# Patient Record
Sex: FEMALE | Race: WHITE | NOT HISPANIC OR LATINO | Employment: FULL TIME | RURAL
[De-identification: names, ages, dates, MRNs, and addresses within clinical notes are randomized per-mention and may not be internally consistent; named-entity substitution may affect disease eponyms.]

---

## 2016-05-10 LAB — CRC RECOMMENDATION EXT: NORMAL

## 2017-09-20 ENCOUNTER — HISTORICAL (OUTPATIENT)
Dept: ADMINISTRATIVE | Facility: HOSPITAL | Age: 52
End: 2017-09-20

## 2017-09-20 LAB — HM PAP SMEAR: NORMAL

## 2017-09-22 LAB
LAB AP GENERAL CAT - HISTORICAL: NORMAL
LAB AP INTERPRETATION/RESULT - HISTORICAL: NEGATIVE
LAB AP SPECIMEN ADEQUACY - HISTORICAL: NORMAL
LAB AP SPECIMEN SUBMITTED - HISTORICAL: NORMAL

## 2020-06-02 ENCOUNTER — HISTORICAL (OUTPATIENT)
Dept: ADMINISTRATIVE | Facility: HOSPITAL | Age: 55
End: 2020-06-02

## 2020-06-02 LAB
25(OH)D3 SERPL-MCNC: 34.3 NG/ML
ALBUMIN SERPL BCP-MCNC: 3.8 G/DL (ref 3.5–5)
ALBUMIN/GLOB SERPL: 1.1 {RATIO}
ALP SERPL-CCNC: 88 U/L (ref 46–118)
ALT SERPL W P-5'-P-CCNC: 18 U/L (ref 13–56)
AST SERPL W P-5'-P-CCNC: 18 U/L (ref 15–37)
BASOPHILS # BLD AUTO: 0.07 X10E3/UL (ref 0–0.2)
BASOPHILS NFR BLD AUTO: 1.2 % (ref 0–1)
BILIRUB SERPL-MCNC: 0.3 MG/DL (ref 0–1.2)
BUN SERPL-MCNC: 11 MG/DL (ref 7–18)
BUN/CREAT SERPL: 13
CALCIUM SERPL-MCNC: 8.8 MG/DL (ref 8.5–10.1)
CHLORIDE SERPL-SCNC: 106 MMOL/L (ref 98–107)
CHOLEST SERPL-MCNC: 208 MG/DL
CO2 SERPL-SCNC: 30 MMOL/L (ref 21–32)
CREAT SERPL-MCNC: 0.84 MG/DL (ref 0.5–1.02)
EOSINOPHIL # BLD AUTO: 0.25 X10E3/UL (ref 0–0.5)
EOSINOPHIL NFR BLD AUTO: 4.3 % (ref 1–4)
ERYTHROCYTE [DISTWIDTH] IN BLOOD BY AUTOMATED COUNT: 13.6 % (ref 11.5–14.5)
GLOBULIN SER-MCNC: 3.6 G/DL (ref 2–4)
GLUCOSE SERPL-MCNC: 85 MG/DL (ref 74–106)
HCT VFR BLD AUTO: 43.1 % (ref 38–47)
HDLC SERPL-MCNC: 71 MG/DL
HGB BLD-MCNC: 13.9 G/DL (ref 12–16)
IMM GRANULOCYTES # BLD AUTO: 0.02 X10E3/UL (ref 0–0.04)
IMM GRANULOCYTES NFR BLD: 0.3 % (ref 0–0.4)
LDLC SERPL CALC-MCNC: 118 MG/DL
LYMPHOCYTES # BLD AUTO: 1.34 X10E3/UL (ref 1–4.8)
LYMPHOCYTES NFR BLD AUTO: 22.9 % (ref 27–41)
MCH RBC QN AUTO: 29.6 PG (ref 27–31)
MCHC RBC AUTO-ENTMCNC: 32.3 G/DL (ref 32–36)
MCV RBC AUTO: 91.7 FL (ref 80–96)
MONOCYTES # BLD AUTO: 0.46 X10E3/UL (ref 0–0.8)
MONOCYTES NFR BLD AUTO: 7.9 % (ref 2–6)
MPC BLD CALC-MCNC: 11.3 FL (ref 9.4–12.4)
NEUTROPHILS # BLD AUTO: 3.71 X10E3/UL (ref 1.8–7.7)
NEUTROPHILS NFR BLD AUTO: 63.4 % (ref 53–65)
NONHDLC SERPL-MCNC: 137 MG/DL
NRBC # BLD AUTO: 0 X10E3/UL (ref 0–0)
NRBC, AUTO (.00): 0 /100 (ref 0–0)
PLATELET # BLD AUTO: 318 X10E3/UL (ref 150–400)
POTASSIUM SERPL-SCNC: 4.4 MMOL/L (ref 3.5–5.1)
PROT SERPL-MCNC: 7.4 G/DL (ref 6.4–8.2)
RBC # BLD AUTO: 4.7 X10E6/UL (ref 4.2–5.4)
SODIUM SERPL-SCNC: 140 MMOL/L (ref 136–145)
TRIGL SERPL-MCNC: 96 MG/DL
TSH SERPL DL<=0.005 MIU/L-ACNC: 0.47 UIU/ML (ref 0.36–3.74)
VLDLC SERPL-MCNC: 19 MG/DL
WBC # BLD AUTO: 5.85 X10E3/UL (ref 4.5–11)

## 2020-06-26 ENCOUNTER — HISTORICAL (OUTPATIENT)
Dept: ADMINISTRATIVE | Facility: HOSPITAL | Age: 55
End: 2020-06-26

## 2021-03-09 VITALS
TEMPERATURE: 98 F | DIASTOLIC BLOOD PRESSURE: 70 MMHG | WEIGHT: 174.63 LBS | HEIGHT: 68 IN | BODY MASS INDEX: 26.47 KG/M2 | SYSTOLIC BLOOD PRESSURE: 116 MMHG | RESPIRATION RATE: 20 BRPM | OXYGEN SATURATION: 99 % | HEART RATE: 80 BPM

## 2021-03-09 RX ORDER — ESTRADIOL 0.1 MG/D
1 FILM, EXTENDED RELEASE TRANSDERMAL
COMMUNITY
End: 2023-07-17 | Stop reason: SDUPTHER

## 2021-03-09 RX ORDER — FERROUS SULFATE, DRIED 160(50) MG
1 TABLET, EXTENDED RELEASE ORAL 2 TIMES DAILY
COMMUNITY
End: 2021-03-23

## 2021-03-09 RX ORDER — LEVOTHYROXINE SODIUM 100 UG/1
100 TABLET ORAL
COMMUNITY
End: 2021-03-23 | Stop reason: SDUPTHER

## 2021-03-23 ENCOUNTER — OFFICE VISIT (OUTPATIENT)
Dept: FAMILY MEDICINE | Facility: CLINIC | Age: 56
End: 2021-03-23
Payer: COMMERCIAL

## 2021-03-23 VITALS
HEIGHT: 68 IN | BODY MASS INDEX: 25.64 KG/M2 | TEMPERATURE: 97 F | HEART RATE: 85 BPM | WEIGHT: 169.19 LBS | RESPIRATION RATE: 20 BRPM | SYSTOLIC BLOOD PRESSURE: 116 MMHG | DIASTOLIC BLOOD PRESSURE: 72 MMHG | OXYGEN SATURATION: 98 %

## 2021-03-23 DIAGNOSIS — E03.9 HYPOTHYROIDISM (ACQUIRED): ICD-10-CM

## 2021-03-23 DIAGNOSIS — Z00.00 ROUTINE GENERAL MEDICAL EXAMINATION AT A HEALTH CARE FACILITY: Primary | ICD-10-CM

## 2021-03-23 DIAGNOSIS — Z13.220 SCREENING FOR HYPERLIPIDEMIA: ICD-10-CM

## 2021-03-23 DIAGNOSIS — Z13.1 DIABETES MELLITUS SCREENING: ICD-10-CM

## 2021-03-23 PROCEDURE — 99396 PREV VISIT EST AGE 40-64: CPT | Mod: ,,, | Performed by: NURSE PRACTITIONER

## 2021-03-23 PROCEDURE — 99396 PR PREVENTIVE VISIT,EST,40-64: ICD-10-PCS | Mod: ,,, | Performed by: NURSE PRACTITIONER

## 2021-03-23 RX ORDER — LEVOTHYROXINE SODIUM 100 UG/1
100 TABLET ORAL
Qty: 90 TABLET | Refills: 3 | Status: SHIPPED | OUTPATIENT
Start: 2021-03-23 | End: 2022-05-30

## 2021-03-23 RX ORDER — NALTREXONE HYDROCHLORIDE AND BUPROPION HYDROCHLORIDE 8; 90 MG/1; MG/1
2 TABLET, EXTENDED RELEASE ORAL 2 TIMES DAILY
COMMUNITY
Start: 2021-02-19 | End: 2022-10-10

## 2021-06-16 ENCOUNTER — OFFICE VISIT (OUTPATIENT)
Dept: DERMATOLOGY | Facility: CLINIC | Age: 56
End: 2021-06-16
Payer: COMMERCIAL

## 2021-06-16 VITALS — WEIGHT: 169 LBS | HEIGHT: 68 IN | RESPIRATION RATE: 18 BRPM | BODY MASS INDEX: 25.61 KG/M2

## 2021-06-16 DIAGNOSIS — D22.9 BENIGN NEVUS OF SKIN: ICD-10-CM

## 2021-06-16 DIAGNOSIS — L82.1 SEBORRHEIC KERATOSES: ICD-10-CM

## 2021-06-16 DIAGNOSIS — L82.0 SEBORRHEIC KERATOSES, INFLAMED: ICD-10-CM

## 2021-06-16 DIAGNOSIS — L57.8 OTHER SKIN CHANGES DUE TO CHRONIC EXPOSURE TO NONIONIZING RADIATION: Primary | ICD-10-CM

## 2021-06-16 PROCEDURE — 17110 DESTRUCTION B9 LES UP TO 14: CPT | Mod: ,,, | Performed by: DERMATOLOGY

## 2021-06-16 PROCEDURE — 17110 PR DESTRUCTION BENIGN LESIONS UP TO 14: ICD-10-PCS | Mod: ,,, | Performed by: DERMATOLOGY

## 2021-06-16 PROCEDURE — 3008F BODY MASS INDEX DOCD: CPT | Mod: ,,, | Performed by: DERMATOLOGY

## 2021-06-16 PROCEDURE — 99213 OFFICE O/P EST LOW 20 MIN: CPT | Mod: 25,,, | Performed by: DERMATOLOGY

## 2021-06-16 PROCEDURE — 3008F PR BODY MASS INDEX (BMI) DOCUMENTED: ICD-10-PCS | Mod: ,,, | Performed by: DERMATOLOGY

## 2021-06-16 PROCEDURE — 99213 PR OFFICE/OUTPT VISIT, EST, LEVL III, 20-29 MIN: ICD-10-PCS | Mod: 25,,, | Performed by: DERMATOLOGY

## 2022-05-12 ENCOUNTER — HOSPITAL ENCOUNTER (OUTPATIENT)
Dept: RADIOLOGY | Facility: HOSPITAL | Age: 57
Discharge: HOME | End: 2022-05-12
Payer: COMMERCIAL

## 2022-05-12 ENCOUNTER — HOSPITAL ENCOUNTER (OUTPATIENT)
Dept: RADIOLOGY | Facility: HOSPITAL | Age: 57
Discharge: HOME | End: 2022-05-12
Attending: RADIOLOGY
Payer: COMMERCIAL

## 2022-05-12 VITALS — BODY MASS INDEX: 25.61 KG/M2 | WEIGHT: 169 LBS | HEIGHT: 68 IN

## 2022-05-12 DIAGNOSIS — R92.8 ABNORMAL MAMMOGRAM: ICD-10-CM

## 2022-05-12 DIAGNOSIS — Z12.31 VISIT FOR SCREENING MAMMOGRAM: ICD-10-CM

## 2022-05-12 PROCEDURE — 77067 SCR MAMMO BI INCL CAD: CPT | Mod: TC

## 2022-05-12 PROCEDURE — 76641 ULTRASOUND BREAST COMPLETE: CPT | Mod: 26,LT,, | Performed by: RADIOLOGY

## 2022-05-12 PROCEDURE — 76641 PR US BREAST COMPLETE UNILAT: ICD-10-PCS | Mod: 26,RT,, | Performed by: RADIOLOGY

## 2022-05-12 PROCEDURE — 76641 ULTRASOUND BREAST COMPLETE: CPT | Mod: 26,RT,, | Performed by: RADIOLOGY

## 2022-05-12 PROCEDURE — 77067 MAMMO DIGITAL SCREENING BILAT: ICD-10-PCS | Mod: 26,,, | Performed by: RADIOLOGY

## 2022-05-12 PROCEDURE — 76641 ULTRASOUND BREAST COMPLETE: CPT | Mod: TC,50

## 2022-05-12 PROCEDURE — 77067 SCR MAMMO BI INCL CAD: CPT | Mod: 26,,, | Performed by: RADIOLOGY

## 2022-10-10 ENCOUNTER — OFFICE VISIT (OUTPATIENT)
Dept: FAMILY MEDICINE | Facility: CLINIC | Age: 57
End: 2022-10-10
Payer: COMMERCIAL

## 2022-10-10 ENCOUNTER — PATIENT MESSAGE (OUTPATIENT)
Dept: FAMILY MEDICINE | Facility: CLINIC | Age: 57
End: 2022-10-10
Payer: COMMERCIAL

## 2022-10-10 VITALS
HEART RATE: 87 BPM | DIASTOLIC BLOOD PRESSURE: 78 MMHG | SYSTOLIC BLOOD PRESSURE: 120 MMHG | OXYGEN SATURATION: 99 % | RESPIRATION RATE: 16 BRPM | BODY MASS INDEX: 27.16 KG/M2 | TEMPERATURE: 99 F | WEIGHT: 179.19 LBS | HEIGHT: 68 IN

## 2022-10-10 DIAGNOSIS — Z12.11 SCREENING FOR MALIGNANT NEOPLASM OF COLON: ICD-10-CM

## 2022-10-10 DIAGNOSIS — Z00.00 ROUTINE GENERAL MEDICAL EXAMINATION AT A HEALTH CARE FACILITY: Primary | ICD-10-CM

## 2022-10-10 DIAGNOSIS — Z86.010 PERSONAL HISTORY OF COLONIC POLYPS: ICD-10-CM

## 2022-10-10 DIAGNOSIS — Z11.59 NEED FOR HEPATITIS C SCREENING TEST: ICD-10-CM

## 2022-10-10 DIAGNOSIS — E55.9 VITAMIN D DEFICIENCY: ICD-10-CM

## 2022-10-10 DIAGNOSIS — E03.9 HYPOTHYROIDISM (ACQUIRED): Chronic | ICD-10-CM

## 2022-10-10 DIAGNOSIS — Z13.220 SCREENING FOR LIPOID DISORDERS: ICD-10-CM

## 2022-10-10 DIAGNOSIS — Z13.1 SCREENING FOR DIABETES MELLITUS: ICD-10-CM

## 2022-10-10 PROBLEM — Z86.0100 PERSONAL HISTORY OF COLONIC POLYPS: Status: ACTIVE | Noted: 2022-10-10

## 2022-10-10 LAB
25(OH)D3 SERPL-MCNC: 32.9 NG/ML
CHOLEST SERPL-MCNC: 214 MG/DL (ref 0–200)
CHOLEST/HDLC SERPL: 2.7 {RATIO}
GLUCOSE SERPL-MCNC: 92 MG/DL (ref 74–106)
HCV AB SER QL: NORMAL
HDLC SERPL-MCNC: 79 MG/DL (ref 40–60)
LDLC SERPL CALC-MCNC: 120 MG/DL
LDLC/HDLC SERPL: 1.5 {RATIO}
NONHDLC SERPL-MCNC: 135 MG/DL
T4 FREE SERPL-MCNC: 1.14 NG/DL (ref 0.76–1.46)
TRIGL SERPL-MCNC: 73 MG/DL (ref 35–150)
TSH SERPL DL<=0.005 MIU/L-ACNC: 0.23 UIU/ML (ref 0.36–3.74)
VLDLC SERPL-MCNC: 15 MG/DL

## 2022-10-10 PROCEDURE — 82947 ASSAY GLUCOSE BLOOD QUANT: CPT | Mod: ,,, | Performed by: CLINICAL MEDICAL LABORATORY

## 2022-10-10 PROCEDURE — 82306 VITAMIN D: ICD-10-PCS | Mod: ,,, | Performed by: CLINICAL MEDICAL LABORATORY

## 2022-10-10 PROCEDURE — 84439 T4, FREE: ICD-10-PCS | Mod: ,,, | Performed by: CLINICAL MEDICAL LABORATORY

## 2022-10-10 PROCEDURE — 1159F MED LIST DOCD IN RCRD: CPT | Mod: ,,, | Performed by: NURSE PRACTITIONER

## 2022-10-10 PROCEDURE — 82947 GLUCOSE, FASTING: ICD-10-PCS | Mod: ,,, | Performed by: CLINICAL MEDICAL LABORATORY

## 2022-10-10 PROCEDURE — 3008F BODY MASS INDEX DOCD: CPT | Mod: ,,, | Performed by: NURSE PRACTITIONER

## 2022-10-10 PROCEDURE — 84443 TSH: ICD-10-PCS | Mod: ,,, | Performed by: CLINICAL MEDICAL LABORATORY

## 2022-10-10 PROCEDURE — 3074F PR MOST RECENT SYSTOLIC BLOOD PRESSURE < 130 MM HG: ICD-10-PCS | Mod: ,,, | Performed by: NURSE PRACTITIONER

## 2022-10-10 PROCEDURE — 99396 PREV VISIT EST AGE 40-64: CPT | Mod: ,,, | Performed by: NURSE PRACTITIONER

## 2022-10-10 PROCEDURE — 86803 HEPATITIS C AB TEST: CPT | Mod: ,,, | Performed by: CLINICAL MEDICAL LABORATORY

## 2022-10-10 PROCEDURE — 84439 ASSAY OF FREE THYROXINE: CPT | Mod: ,,, | Performed by: CLINICAL MEDICAL LABORATORY

## 2022-10-10 PROCEDURE — 3078F PR MOST RECENT DIASTOLIC BLOOD PRESSURE < 80 MM HG: ICD-10-PCS | Mod: ,,, | Performed by: NURSE PRACTITIONER

## 2022-10-10 PROCEDURE — 3074F SYST BP LT 130 MM HG: CPT | Mod: ,,, | Performed by: NURSE PRACTITIONER

## 2022-10-10 PROCEDURE — 84443 ASSAY THYROID STIM HORMONE: CPT | Mod: ,,, | Performed by: CLINICAL MEDICAL LABORATORY

## 2022-10-10 PROCEDURE — 86803 HEPATITIS C ANTIBODY: ICD-10-PCS | Mod: ,,, | Performed by: CLINICAL MEDICAL LABORATORY

## 2022-10-10 PROCEDURE — 80061 LIPID PANEL: ICD-10-PCS | Mod: ,,, | Performed by: CLINICAL MEDICAL LABORATORY

## 2022-10-10 PROCEDURE — 1160F RVW MEDS BY RX/DR IN RCRD: CPT | Mod: ,,, | Performed by: NURSE PRACTITIONER

## 2022-10-10 PROCEDURE — 1159F PR MEDICATION LIST DOCUMENTED IN MEDICAL RECORD: ICD-10-PCS | Mod: ,,, | Performed by: NURSE PRACTITIONER

## 2022-10-10 PROCEDURE — 1160F PR REVIEW ALL MEDS BY PRESCRIBER/CLIN PHARMACIST DOCUMENTED: ICD-10-PCS | Mod: ,,, | Performed by: NURSE PRACTITIONER

## 2022-10-10 PROCEDURE — 3078F DIAST BP <80 MM HG: CPT | Mod: ,,, | Performed by: NURSE PRACTITIONER

## 2022-10-10 PROCEDURE — 82306 VITAMIN D 25 HYDROXY: CPT | Mod: ,,, | Performed by: CLINICAL MEDICAL LABORATORY

## 2022-10-10 PROCEDURE — 80061 LIPID PANEL: CPT | Mod: ,,, | Performed by: CLINICAL MEDICAL LABORATORY

## 2022-10-10 PROCEDURE — 99396 PR PREVENTIVE VISIT,EST,40-64: ICD-10-PCS | Mod: ,,, | Performed by: NURSE PRACTITIONER

## 2022-10-10 PROCEDURE — 3008F PR BODY MASS INDEX (BMI) DOCUMENTED: ICD-10-PCS | Mod: ,,, | Performed by: NURSE PRACTITIONER

## 2022-10-10 NOTE — PATIENT INSTRUCTIONS
"Patient Education       Diet and Health   The Basics   Written by the doctors and editors at [facility]   Why is it important to eat a healthy diet? -- It's important to eat a healthy diet because eating the right foods can keep you healthy now and later on in life.  Which foods are especially healthy? -- Foods that are especially healthy include:  Fruits and vegetables - Eating a diet with lots of fruits and vegetables can help prevent heart disease and strokes. It might also help prevent certain types of cancers. Try to eat fruits and vegetables at each meal and also for snacks. If you don't have fresh fruits and vegetables available, you can eat frozen or canned ones instead. Doctors recommend eating at least 2 1/2 servings of vegetables and 2 servings of fruits each day.  Foods with fiber - Eating foods with a lot of fiber can help prevent heart disease and strokes. If you have type 2 diabetes, it can also help control your blood sugar. Foods that have a lot of fiber include vegetables, fruits, beans, nuts, oatmeal, and whole grain breads and cereals. You can tell how much fiber is in a food by reading the nutrition label (figure 1). Doctors recommend eating 25 to 36 grams of fiber each day.  Foods with folate (also called folic acid) - Folate is a vitamin that is important for pregnant people, since it helps prevent certain birth defects. Anyone who could get pregnant should get at least 400 micrograms of folic acid daily, whether or not they are actively trying to get pregnant. Folate is found in many breakfast cereals, oranges, orange juice, and green leafy vegetables.  Foods with calcium and vitamin D - Babies, children, and adults need calcium and vitamin D to help keep their bones strong. Adults also need calcium and vitamin D to help prevent osteoporosis. Osteoporosis is a condition that causes bones to get "thin" and break more easily than usual. Different foods and drinks have calcium and vitamin D in " "them (figure 2). People who don't get enough calcium and vitamin D in their diet might need to take a supplement.  Foods with protein - Protein helps your muscles stay strong. Healthy foods with a lot of protein include chicken, fish, eggs, beans, nuts, and soy products. Red meat also has a lot of protein, but it also contains fats, which can be unhealthy.  Some experts recommend a "Mediterranean diet." This involves eating a lot of fruits, vegetables, nuts, whole grains, and olive oil. It also includes some fish, poultry, and dairy products, but not a lot of red meat. Eating this way can help your overall health, and might even lower your risk of having a stroke.  What foods should I avoid or limit? -- To eat a healthy diet, there are some things you should avoid or limit. They include:   Fats - There are different types of fats. Some types of fats are better for your body than others.  Trans fats are especially unhealthy. They are found in margarines, many fast foods, and some store-bought baked goods. Trans fats can raise your cholesterol level and your increase your chance of getting heart disease. You should avoid eating foods with these types of fats.  The type of "polyunsaturated" fats found in fish seems to be healthy and can reduce your chance of getting heart disease. Other polyunsaturated fats might also be good for your health. When you cook, it's best to use oils with healthier fats, such as olive oil and canola oil.  Sugar - To have a healthy diet, it's important to limit or avoid sugar, sweets, and refined grains. Refined grains are found in white bread, white rice, most forms of pasta, and most packaged "snack" foods. Whole grains, such as whole-wheat bread and brown rice, have more fiber and are better for your health.  Avoiding sugar-sweetened beverages, like soda and sports drinks, can also help improve your health.  Red meat - Studies have shown that eating a lot of red meat can increase your " "risk of certain health problems, including heart disease and cancer. You should limit the amount of red meat that you eat.  Can I drink alcohol as part of a healthy diet? -- People who drink a small amount of alcohol each day might have a lower chance of getting heart disease. But drinking alcohol can lead to problems. For example, it can raise a person's chances of getting liver disease and certain types of cancers. In women, even 1 drink a day can increase the risk of getting breast cancer.  Most doctors recommend that adult women not have more than 1 drink a day and that adult men not have more than 2 drinks a day. The limits are different because most women's bodies take longer to break down alcohol.  How many calories do I need each day? -- The number of calories you need each day depends on your weight, height, age, sex, and how active you are.  Your doctor or nurse can tell you how many calories you should eat each day. If you are trying to lose weight, you should eat fewer calories each day.  What if I have questions? -- If you have questions about which foods you should or should not eat, ask your doctor or nurse. The right diet for you will depend, in part, on your health and any medical conditions you have.  All topics are updated as new evidence becomes available and our peer review process is complete.  This topic retrieved from [employer] on: Sep 21, 2021.  Topic 39003 Version 20.0  Release: 29.4.2 - C29.263  © 2021 UpToDate, Inc. and/or its affiliates. All rights reserved.  figure 1: Nutrition label - fiber     This is an example of a nutrition label. To figure out how much fiber is in a food, look for the line that says "Dietary Fiber." It's also important to look at the serving size. This food has 7 grams of fiber in each serving, and each serving is 1 cup.  Graphic 00147 Version 7.0    figure 2: Foods and drinks with calcium and vitamin D     Foods rich in calcium include ice cream, soy milk, breads, " "kale, broccoli, milk, cheese, cottage cheese, almonds, yogurt, ready-to-eat cereals, beans, and tofu. Foods rich in vitamin D include milk, fortified plant-based "milks" (soy, almond), canned tuna fish, cod liver oil, yogurt, ready-to-eat-cereals, cooked salmon, canned sardines, mackerel, and eggs. Some of these foods are rich in both.  Graphic 57431 Version 4.0    Consumer Information Use and Disclaimer   This information is not specific medical advice and does not replace information you receive from your health care provider. This is only a brief summary of general information. It does NOT include all information about conditions, illnesses, injuries, tests, procedures, treatments, therapies, discharge instructions or life-style choices that may apply to you. You must talk with your health care provider for complete information about your health and treatment options. This information should not be used to decide whether or not to accept your health care provider's advice, instructions or recommendations. Only your health care provider has the knowledge and training to provide advice that is right for you. The use of this information is governed by the [employer] End User License Agreement, available at https://www.Screwpulp.Feedjit/en/solutions/[employer]/about/elif.The use of [employer] content is governed by the [employer] Terms of Use. ©2021 UpToDate, Inc. All rights reserved.  Copyright   © 2021 UpToDate, Inc. and/or its affiliates. All rights reserved.    "

## 2022-10-10 NOTE — PROGRESS NOTES
[facility] - FAMILY MEDICINE       PATIENT NAME: Julian Lantigua   : 1965    AGE: 57 y.o. DATE OF ENCOUNTER: 10/10/22    MRN: [id number]      Subjective     Patient ID: Julian Lantigua is a 57 y.o. female.    Chief Complaint: Annual Exam and Thyroid Problem (Yearly f/u hypothyroidism)    Healthy You   Requests vit D level be included today d/t hx deficiency.    Thyroid Problem  Presents for follow-up visit. Symptoms include weight gain. Patient reports no anxiety, constipation, depressed mood, fatigue, hair loss, hoarse voice, leg swelling, palpitations or weight loss. The symptoms have been stable.   Review of Systems   Constitutional:  Positive for weight gain. Negative for fatigue and weight loss.   HENT: Negative.  Negative for hoarse voice.    Respiratory: Negative.     Cardiovascular: Negative.  Negative for palpitations.   Gastrointestinal: Negative.  Negative for constipation.   Endocrine: Negative for hair loss.   Genitourinary: Negative.    Musculoskeletal: Negative.    Neurological: Negative.    Psychiatric/Behavioral: Negative.  The patient is not nervous/anxious.      Tobacco Use: Low Risk     Smoking Tobacco Use: Never    Smokeless Tobacco Use: Never    Passive Exposure: Not on file     Review of patient's allergies indicates:   Allergen Reactions    Ceftin [cefuroxime axetil] Nausea And Vomiting    Macrobid [nitrofurantoin monohyd/m-cryst] Hives and Swelling     Current Outpatient Medications   Medication Instructions    estradioL (VIVELLE-DOT) 0.1 mg/24 hr PTSW 1 patch, Transdermal, Twice weekly    levothyroxine (SYNTHROID) 100 mcg, Oral, Daily    multivit-min/ferrous fumarate (MULTI VITAMIN ORAL) 1 tablet, Oral, Daily     Medications Discontinued During This Encounter   Medication Reason    CONTRAVE 8-90 mg TbSR Patient no longer taking       Past Medical History:   Diagnosis Date    Encounter for long-term (current) use of other medications 2017    Epicondylitis,  "lateral, left 01/19/2017    Hypothyroidism 04/15/2016    Menopausal flushing 11/03/2016    Personal history of colonic polyps     Dr. Pressley     Health Maintenance Topics with due status: Not Due       Topic Last Completion Date    Lipid Panel 03/23/2021    Mammogram 05/12/2022     Immunization History   Administered Date(s) Administered    Influenza 11/02/2021       Objective     Body mass index is 27.25 kg/m².  Wt Readings from Last 3 Encounters:   10/10/22 81.3 kg (179 lb 3.2 oz)   05/12/22 76.7 kg (169 lb)   06/16/21 76.7 kg (169 lb)     Ht Readings from Last 3 Encounters:   10/10/22 5' 8" (1.727 m)   05/12/22 5' 8" (1.727 m)   06/16/21 5' 8" (1.727 m)     BP Readings from Last 3 Encounters:   10/10/22 120/78   03/23/21 116/72   03/03/20 116/70     Temp Readings from Last 3 Encounters:   10/10/22 98.5 °F (36.9 °C) (Oral)   03/23/21 97.2 °F (36.2 °C) (Temporal)   03/03/20 98.3 °F (36.8 °C) (Oral)     Pulse Readings from Last 3 Encounters:   10/10/22 87   03/23/21 85   03/03/20 80     Resp Readings from Last 3 Encounters:   10/10/22 16   06/16/21 18   03/23/21 20     PF Readings from Last 3 Encounters:   No data found for PF       Physical Exam  Vitals and nursing note reviewed.   Constitutional:       General: She is not in acute distress.     Appearance: Normal appearance.   HENT:      Head: Normocephalic.      Right Ear: Tympanic membrane, ear canal and external ear normal.      Left Ear: Tympanic membrane, ear canal and external ear normal.      Nose: Nose normal.      Mouth/Throat:      Mouth: Mucous membranes are moist.      Pharynx: Oropharynx is clear.   Eyes:      Conjunctiva/sclera: Conjunctivae normal.      Pupils: Pupils are equal, round, and reactive to light.   Neck:      Thyroid: No thyroid mass or thyromegaly.      Vascular: Normal carotid pulses. No carotid bruit.   Cardiovascular:      Rate and Rhythm: Normal rate and regular rhythm.      Pulses: Normal pulses.      Heart sounds: Normal heart " sounds.   Pulmonary:      Effort: Pulmonary effort is normal.      Breath sounds: Normal breath sounds.   Abdominal:      Palpations: Abdomen is soft.      Tenderness: There is no abdominal tenderness.   Musculoskeletal:      Cervical back: Neck supple.      Right lower leg: No edema.      Left lower leg: No edema.   Lymphadenopathy:      Cervical: No cervical adenopathy.   Skin:     General: Skin is warm and dry.   Neurological:      General: No focal deficit present.      Mental Status: She is alert and oriented to person, place, and time.   Psychiatric:         Mood and Affect: Mood normal.         Behavior: Behavior normal.       Assessment and Plan     Problem List Items Addressed This Visit          Endocrine    Hypothyroidism (acquired) (Chronic)    Relevant Orders    TSH    T4, Free       GI    Personal history of colonic polyps     Other Visit Diagnoses       Routine general medical examination at a health care facility    -  Primary    Screening for diabetes mellitus        Relevant Orders    Glucose, Fasting    Screening for lipoid disorders        Relevant Orders    Lipid Panel    Vitamin D deficiency        Relevant Orders    Vitamin D    Need for hepatitis C screening test        Relevant Orders    Hepatitis C Antibody    Screening for malignant neoplasm of colon        Relevant Orders    Ambulatory referral/consult to Gastroenterology            Plan:   Had Moderna Covid vaccines x2 but doesn't have card w/ her, will send dates via portal.  Declines booster dose.  Will get flu shot at work.  Declines TDAP update today.  Refer for updated colonoscopy screen.  Hx colon polyps, last colonoscopy May 2016.  Declines Shingrix.    Wellness labs + thyroid fxn & vit D - will notify when reviewed & send refills to [facility].      I have reviewed the medications, allergies, and problem list.     Goal Actions:    What type of visit is the patient here for today?: Healthy You  Does the patient consent to enroll in  Color Me Healthy?: No (N/A)  Is this a Wellness Follow Up?: No  What is your overall wellness goal? (select at least one): Lose weight  Choose 3: Biometric, Lifestyle, Nutrition, Exercise  Biometric Actions: SBP<120 and DBP<80  Nurtrition Actions: Eat a well-balanced diet, drink 8-10 glasses of water per day, Avoid carbonated beverages  Exercise Actions: Recommend physical activity 30 minutes per day 3-5 times/week    Yearly wellness visit, f/u hypothyroidism w/ fasting labs.    Frances NAYLOR

## 2022-10-12 DIAGNOSIS — E03.9 HYPOTHYROIDISM (ACQUIRED): ICD-10-CM

## 2022-10-12 RX ORDER — LEVOTHYROXINE SODIUM 100 UG/1
100 TABLET ORAL DAILY
Qty: 90 TABLET | Refills: 3 | Status: SHIPPED | OUTPATIENT
Start: 2022-10-12 | End: 2023-10-17 | Stop reason: SDUPTHER

## 2022-10-31 ENCOUNTER — TELEPHONE (OUTPATIENT)
Dept: GASTROENTEROLOGY | Facility: CLINIC | Age: 57
End: 2022-10-31
Payer: COMMERCIAL

## 2022-10-31 DIAGNOSIS — Z86.010 HX OF COLONIC POLYPS: Primary | ICD-10-CM

## 2022-12-09 ENCOUNTER — PATIENT OUTREACH (OUTPATIENT)
Dept: ADMINISTRATIVE | Facility: HOSPITAL | Age: 57
End: 2022-12-09
Payer: COMMERCIAL

## 2023-01-23 ENCOUNTER — ANESTHESIA (OUTPATIENT)
Dept: GASTROENTEROLOGY | Facility: HOSPITAL | Age: 58
End: 2023-01-23
Payer: COMMERCIAL

## 2023-01-23 ENCOUNTER — HOSPITAL ENCOUNTER (OUTPATIENT)
Dept: GASTROENTEROLOGY | Facility: HOSPITAL | Age: 58
Discharge: HOME | End: 2023-01-23
Attending: INTERNAL MEDICINE
Payer: COMMERCIAL

## 2023-01-23 ENCOUNTER — ANESTHESIA EVENT (OUTPATIENT)
Dept: GASTROENTEROLOGY | Facility: HOSPITAL | Age: 58
End: 2023-01-23
Payer: COMMERCIAL

## 2023-01-23 VITALS
OXYGEN SATURATION: 98 % | SYSTOLIC BLOOD PRESSURE: 118 MMHG | HEART RATE: 58 BPM | WEIGHT: 179 LBS | RESPIRATION RATE: 12 BRPM | TEMPERATURE: 98 F | BODY MASS INDEX: 27.22 KG/M2 | DIASTOLIC BLOOD PRESSURE: 69 MMHG

## 2023-01-23 DIAGNOSIS — K62.1 POLYP OF RECTUM: ICD-10-CM

## 2023-01-23 DIAGNOSIS — Z12.11 SCREENING FOR COLON CANCER: Primary | ICD-10-CM

## 2023-01-23 DIAGNOSIS — K57.30 DIVERTICULOSIS OF COLON: ICD-10-CM

## 2023-01-23 DIAGNOSIS — K63.5 POLYP OF DESCENDING COLON, UNSPECIFIED TYPE: ICD-10-CM

## 2023-01-23 DIAGNOSIS — Z86.010 HX OF COLONIC POLYPS: ICD-10-CM

## 2023-01-23 PROCEDURE — 37000008 HC ANESTHESIA 1ST 15 MINUTES

## 2023-01-23 PROCEDURE — D9220A PRA ANESTHESIA: Mod: 33,,, | Performed by: NURSE ANESTHETIST, CERTIFIED REGISTERED

## 2023-01-23 PROCEDURE — 37000009 HC ANESTHESIA EA ADD 15 MINS

## 2023-01-23 PROCEDURE — 63600175 PHARM REV CODE 636 W HCPCS: Performed by: NURSE ANESTHETIST, CERTIFIED REGISTERED

## 2023-01-23 PROCEDURE — 27201423 OPTIME MED/SURG SUP & DEVICES STERILE SUPPLY

## 2023-01-23 PROCEDURE — 88305 TISSUE EXAM BY PATHOLOGIST: CPT | Mod: 26,,, | Performed by: PATHOLOGY

## 2023-01-23 PROCEDURE — D9220A PRA ANESTHESIA: ICD-10-PCS | Mod: 33,,, | Performed by: NURSE ANESTHETIST, CERTIFIED REGISTERED

## 2023-01-23 PROCEDURE — 25000003 PHARM REV CODE 250: Performed by: NURSE ANESTHETIST, CERTIFIED REGISTERED

## 2023-01-23 PROCEDURE — 45380 COLONOSCOPY AND BIOPSY: CPT | Mod: 33,,, | Performed by: INTERNAL MEDICINE

## 2023-01-23 PROCEDURE — 88305 TISSUE EXAM BY PATHOLOGIST: CPT | Mod: TC,SUR | Performed by: INTERNAL MEDICINE

## 2023-01-23 PROCEDURE — 27000284 HC CANNULA NASAL: Performed by: NURSE ANESTHETIST, CERTIFIED REGISTERED

## 2023-01-23 PROCEDURE — 45380 COLONOSCOPY AND BIOPSY: CPT | Mod: PT | Performed by: INTERNAL MEDICINE

## 2023-01-23 PROCEDURE — 45380 PR COLONOSCOPY,BIOPSY: ICD-10-PCS | Mod: 33,,, | Performed by: INTERNAL MEDICINE

## 2023-01-23 PROCEDURE — 88305 SURGICAL PATHOLOGY: ICD-10-PCS | Mod: 26,,, | Performed by: PATHOLOGY

## 2023-01-23 RX ORDER — PROPOFOL 10 MG/ML
VIAL (ML) INTRAVENOUS
Status: DISCONTINUED | OUTPATIENT
Start: 2023-01-23 | End: 2023-01-23

## 2023-01-23 RX ORDER — PHENYLEPHRINE HYDROCHLORIDE 10 MG/ML
INJECTION INTRAVENOUS
Status: DISCONTINUED | OUTPATIENT
Start: 2023-01-23 | End: 2023-01-23

## 2023-01-23 RX ORDER — SODIUM CHLORIDE 0.9 % (FLUSH) 0.9 %
10 SYRINGE (ML) INJECTION
Status: CANCELLED | OUTPATIENT
Start: 2023-01-23

## 2023-01-23 RX ORDER — LIDOCAINE HYDROCHLORIDE 20 MG/ML
INJECTION, SOLUTION EPIDURAL; INFILTRATION; INTRACAUDAL; PERINEURAL
Status: DISCONTINUED | OUTPATIENT
Start: 2023-01-23 | End: 2023-01-23

## 2023-01-23 RX ADMIN — PROPOFOL 30 MG: 10 INJECTION, EMULSION INTRAVENOUS at 09:01

## 2023-01-23 RX ADMIN — PROPOFOL 20 MG: 10 INJECTION, EMULSION INTRAVENOUS at 09:01

## 2023-01-23 RX ADMIN — PHENYLEPHRINE HYDROCHLORIDE 100 MCG: 10 INJECTION INTRAVENOUS at 09:01

## 2023-01-23 RX ADMIN — PROPOFOL 40 MG: 10 INJECTION, EMULSION INTRAVENOUS at 09:01

## 2023-01-23 RX ADMIN — PROPOFOL 10 MG: 10 INJECTION, EMULSION INTRAVENOUS at 09:01

## 2023-01-23 RX ADMIN — PROPOFOL 100 MG: 10 INJECTION, EMULSION INTRAVENOUS at 09:01

## 2023-01-23 RX ADMIN — LIDOCAINE HYDROCHLORIDE 100 MG: 20 INJECTION, SOLUTION EPIDURAL; INFILTRATION; INTRACAUDAL; PERINEURAL at 09:01

## 2023-01-23 RX ADMIN — SODIUM CHLORIDE: 9 INJECTION, SOLUTION INTRAVENOUS at 09:01

## 2023-01-23 NOTE — ANESTHESIA PREPROCEDURE EVALUATION
01/23/2023  Julian Lantigua is a 58 y.o., female.    Past Medical History:   Diagnosis Date    Encounter for long-term (current) use of other medications 09/25/2017    Epicondylitis, lateral, left 01/19/2017    Hypothyroidism 04/15/2016    Menopausal flushing 11/03/2016    Personal history of colonic polyps 05/10/2016    Dr. Wendie YUNG (postoperative nausea and vomiting)        Past Surgical History:   Procedure Laterality Date    APPENDECTOMY      COLONOSCOPY  05/10/2016    HYSTERECTOMY  2004    Partial    TUBAL LIGATION         Family History   Problem Relation Age of Onset    Lung cancer Mother     Cancer Father     Thyroid disease Father     Diabetes Brother     No Known Problems Maternal Grandmother     Cancer Maternal Grandfather     Diabetes Paternal Grandmother     No Known Problems Paternal Grandfather     Thyroid disease Daughter        Social History     Socioeconomic History    Marital status:      Spouse name: Waylon Reagan    Number of children: 1   Tobacco Use    Smoking status: Never    Smokeless tobacco: Never   Substance and Sexual Activity    Alcohol use: Not Currently    Drug use: Never    Sexual activity: Yes     Partners: Male       Current Outpatient Medications   Medication Sig Dispense Refill    estradioL (VIVELLE-DOT) 0.1 mg/24 hr PTSW Place 1 patch onto the skin twice a week.      levothyroxine (SYNTHROID) 100 MCG tablet Take 1 tablet (100 mcg total) by mouth once daily. 90 tablet 3    multivit-min/ferrous fumarate (MULTI VITAMIN ORAL) Take 1 tablet by mouth once daily.       No current facility-administered medications for this encounter.       Review of patient's allergies indicates:   Allergen Reactions    Ceftin [cefuroxime axetil] Nausea And Vomiting    Macrobid [nitrofurantoin monohyd/m-cryst] Hives and Swelling       Pre-op  Assessment    I have reviewed the Patient Summary Reports.    I have reviewed the NPO Status.   I have reviewed the Medications.     Review of Systems  Anesthesia Hx:  PONV   Endocrine:   Hypothyroidism        Physical Exam    Airway:  Mallampati: II   Mouth Opening: Normal  TM Distance: 4 - 6 cm  Tongue: Normal  Neck ROM: Normal ROM        Anesthesia Plan  Type of Anesthesia, risks & benefits discussed:    Anesthesia Type: Gen Natural Airway  Intra-op Monitoring Plan: Standard ASA Monitors  Post Op Pain Control Plan: multimodal analgesia  Induction:  IV  Informed Consent: Informed consent signed with the Patient and all parties understand the risks and agree with anesthesia plan.  All questions answered. Patient consented to blood products? Yes  ASA Score: 2  Day of Surgery Review of History & Physical: H&P Update referred to the surgeon/provider.    Ready For Surgery From Anesthesia Perspective.     .

## 2023-01-23 NOTE — ANESTHESIA POSTPROCEDURE EVALUATION
Anesthesia Post Evaluation    Patient: Julian Lantigua    Procedure(s) Performed: Colonoscopy    Final Anesthesia Type: general      Patient location during evaluation: GI PACU  Patient participation: Yes- Able to Participate  Level of consciousness: awake and alert  Post-procedure vital signs: reviewed and stable  Pain management: adequate  Airway patency: patent  JUSTICE mitigation strategies: Multimodal analgesia  PONV status at discharge: No PONV  Anesthetic complications: no      Cardiovascular status: stable  Respiratory status: unassisted  Hydration status: euvolemic  Follow-up not needed.          Vitals Value Taken Time   /72 01/23/23 1015   Temp 97.7F 01/23/23 1020   Pulse 60 01/23/23 1019   Resp 11 01/23/23 1019   SpO2 100 % 01/23/23 1019   Vitals shown include unvalidated device data.      No case tracking events are documented in the log.      Pain/Joel Score: Joel Score: 9 (1/23/2023  9:58 AM)

## 2023-01-23 NOTE — TRANSFER OF CARE
Anesthesia Transfer of Care Note    Patient: Julian Lantigua    Procedure(s) Performed: Colonoscopy    Patient location: GI    Anesthesia Type: general    Transport from OR: Transported from OR on room air with adequate spontaneous ventilation    Post pain: adequate analgesia    Post assessment: no apparent anesthetic complications and tolerated procedure well    Post vital signs: stable    Level of consciousness: responds to stimulation    Nausea/Vomiting: no nausea/vomiting    Complications: none    Transfer of care protocol was followed      Last vitals:   Visit Vitals  BP (!) 116/59 (BP Location: Right arm, Patient Position: Lying)   Pulse (!) 57   Temp 36.5 °C (97.7 °F) (Skin)   Resp 18   Wt 81.2 kg (179 lb)   SpO2 99%   Breastfeeding No   BMI 27.22 kg/m²

## 2023-01-23 NOTE — DISCHARGE INSTRUCTIONS
Procedure Date  1/23/23     Impression  Overall Impression: Diverticula were noted in the sigmoid and descending colon. Two diminutive polyps were removed with biopsy from the descending colon and rectum.     Recommendation    Await pathology results     Repeat colonoscopy in 5 years      High fiber diet  Disp: DC to home in stable condition. Resume diet. No driving x 24 hours. F/U with PCP as scheduled. Dx: Colon diverticulosis, history of colon polyps, two polyps were removed on this exam.    No driving today, no operating heavy machinery, no signing any legal documents until tomorrow.    Drink lots of fluids, resume regular diet.  Take your normal medications.

## 2023-01-23 NOTE — H&P
[facility] - Endoscopy  Gastroenterology  H&P    Patient Name: Julian Lantigua  MRN: [id number]  Admission Date: 1/23/2023  Code Status: No Order    Attending Provider: Tripp Pressley MD   Primary Care Physician: DAYDAY Levin  Principal Problem:<principal problem not specified>    Subjective:     History of Present Illness: Pt has history of colon polyps. Her last colonoscopy was 2016.    Past Medical History:   Diagnosis Date    Encounter for long-term (current) use of other medications 09/25/2017    Epicondylitis, lateral, left 01/19/2017    Hypothyroidism 04/15/2016    Menopausal flushing 11/03/2016    Personal history of colonic polyps 05/10/2016    Dr. Wendie YUNG (postoperative nausea and vomiting)        Past Surgical History:   Procedure Laterality Date    APPENDECTOMY      COLONOSCOPY  05/10/2016    HYSTERECTOMY  2004    Partial    TUBAL LIGATION         Review of patient's allergies indicates:   Allergen Reactions    Ceftin [cefuroxime axetil] Nausea And Vomiting    Macrobid [nitrofurantoin monohyd/m-cryst] Hives and Swelling     Family History       Problem Relation (Age of Onset)    Cancer Father, Maternal Grandfather    Diabetes Brother, Paternal Grandmother    Lung cancer Mother    No Known Problems Maternal Grandmother, Paternal Grandfather    Thyroid disease Father, Daughter          Tobacco Use    Smoking status: Never    Smokeless tobacco: Never   Substance and Sexual Activity    Alcohol use: Not Currently    Drug use: Never    Sexual activity: Yes     Partners: Male     Review of Systems   Respiratory: Negative.     Cardiovascular: Negative.    Gastrointestinal: Negative.    Objective:     Vital Signs (Most Recent):  Pulse: 74 (01/23/23 0830)  Resp: 12 (01/23/23 0830)  BP: (!) 140/74 (01/23/23 0830)  SpO2: 99 % (01/23/23 0830)   Vital Signs (24h Range):  Pulse:  [74] 74  Resp:  [12] 12  SpO2:  [99 %] 99 %  BP: (140)/(74) 140/74     Weight: 81.2 kg (179 lb) (01/23/23 0830)  Body mass  index is 27.22 kg/m².    No intake or output data in the 24 hours ending 01/23/23 0930    Lines/Drains/Airways       Peripheral Intravenous Line  Duration                  Peripheral IV - Single Lumen 01/23/23 0830 22 G Left;Posterior Hand <1 day                    Physical Exam  Vitals reviewed.   Constitutional:       General: She is not in acute distress.     Appearance: Normal appearance. She is well-developed. She is not ill-appearing.   HENT:      Head: Normocephalic and atraumatic.      Nose: Nose normal.   Eyes:      Pupils: Pupils are equal, round, and reactive to light.   Cardiovascular:      Rate and Rhythm: Normal rate and regular rhythm.   Pulmonary:      Effort: Pulmonary effort is normal.      Breath sounds: Normal breath sounds. No wheezing.   Abdominal:      General: Abdomen is flat. Bowel sounds are normal. There is no distension.      Palpations: Abdomen is soft.      Tenderness: There is no abdominal tenderness. There is no guarding.   Skin:     General: Skin is warm and dry.      Coloration: Skin is not jaundiced.   Neurological:      Mental Status: She is alert.   Psychiatric:         Attention and Perception: Attention normal.         Mood and Affect: Affect normal.         Speech: Speech normal.         Behavior: Behavior is cooperative.      Comments: Pt was calm while speaking.       Significant Labs:  CBC: No results for input(s): WBC, HGB, HCT, PLT in the last 48 hours.  CMP: No results for input(s): GLU, CALCIUM, ALBUMIN, PROT, NA, K, CO2, CL, BUN, CREATININE, ALKPHOS, ALT, AST, BILITOT in the last 48 hours.    Significant Imaging:  Imaging results within the past 24 hours have been reviewed.    Assessment/Plan:     There are no hospital problems to display for this patient.        Imp: history of colon polyps  Plan: colonoscopy    Tripp Pressley MD  Gastroenterology  [facility] - Endoscopy

## 2023-01-24 LAB
ESTROGEN SERPL-MCNC: NORMAL PG/ML
INSULIN SERPL-ACNC: NORMAL U[IU]/ML
LAB AP GROSS DESCRIPTION: NORMAL
LAB AP LABORATORY NOTES: NORMAL
T3RU NFR SERPL: NORMAL %

## 2023-01-31 ENCOUNTER — TELEPHONE (OUTPATIENT)
Dept: GASTROENTEROLOGY | Facility: CLINIC | Age: 58
End: 2023-01-31
Payer: COMMERCIAL

## 2023-01-31 NOTE — TELEPHONE ENCOUNTER
Your colonoscopy pathology results show NO cancer or infections. Dr. Pressley removed a few polyps, so he recommends repeating your colonoscopy in 5 years. Please continue to follow recommendations given after your colonoscopy and call our office with any questions or concerns.      ----- Message from Tripp Pressley MD sent at 1/25/2023  5:22 PM CST -----  Place pt on list for colonoscopy in 5 yrs due to history of colon polyps. These polyps were hyperplastic.

## 2023-07-17 ENCOUNTER — OFFICE VISIT (OUTPATIENT)
Dept: OBSTETRICS AND GYNECOLOGY | Facility: CLINIC | Age: 58
End: 2023-07-17
Payer: COMMERCIAL

## 2023-07-17 VITALS
HEIGHT: 68 IN | OXYGEN SATURATION: 100 % | RESPIRATION RATE: 19 BRPM | DIASTOLIC BLOOD PRESSURE: 70 MMHG | SYSTOLIC BLOOD PRESSURE: 116 MMHG | WEIGHT: 175 LBS | BODY MASS INDEX: 26.52 KG/M2 | HEART RATE: 88 BPM

## 2023-07-17 DIAGNOSIS — Z01.419 ROUTINE GYNECOLOGICAL EXAMINATION: Primary | ICD-10-CM

## 2023-07-17 PROCEDURE — 88142 CYTOPATH C/V THIN LAYER: CPT | Mod: TC,GCY | Performed by: ADVANCED PRACTICE MIDWIFE

## 2023-07-17 PROCEDURE — 3008F BODY MASS INDEX DOCD: CPT | Mod: ,,, | Performed by: ADVANCED PRACTICE MIDWIFE

## 2023-07-17 PROCEDURE — 3074F PR MOST RECENT SYSTOLIC BLOOD PRESSURE < 130 MM HG: ICD-10-PCS | Mod: ,,, | Performed by: ADVANCED PRACTICE MIDWIFE

## 2023-07-17 PROCEDURE — 1159F MED LIST DOCD IN RCRD: CPT | Mod: ,,, | Performed by: ADVANCED PRACTICE MIDWIFE

## 2023-07-17 PROCEDURE — 87624 HUMAN PAPILLOMAVIRUS (HPV): ICD-10-PCS | Mod: ,,, | Performed by: CLINICAL MEDICAL LABORATORY

## 2023-07-17 PROCEDURE — 1159F PR MEDICATION LIST DOCUMENTED IN MEDICAL RECORD: ICD-10-PCS | Mod: ,,, | Performed by: ADVANCED PRACTICE MIDWIFE

## 2023-07-17 PROCEDURE — 3078F PR MOST RECENT DIASTOLIC BLOOD PRESSURE < 80 MM HG: ICD-10-PCS | Mod: ,,, | Performed by: ADVANCED PRACTICE MIDWIFE

## 2023-07-17 PROCEDURE — 99213 OFFICE O/P EST LOW 20 MIN: CPT | Mod: PBBFAC | Performed by: ADVANCED PRACTICE MIDWIFE

## 2023-07-17 PROCEDURE — 3078F DIAST BP <80 MM HG: CPT | Mod: ,,, | Performed by: ADVANCED PRACTICE MIDWIFE

## 2023-07-17 PROCEDURE — 99402 PREV MED CNSL INDIV APPRX 30: CPT | Mod: S$PBB,,, | Performed by: ADVANCED PRACTICE MIDWIFE

## 2023-07-17 PROCEDURE — 87624 HPV HI-RISK TYP POOLED RSLT: CPT | Mod: ,,, | Performed by: CLINICAL MEDICAL LABORATORY

## 2023-07-17 PROCEDURE — 3008F PR BODY MASS INDEX (BMI) DOCUMENTED: ICD-10-PCS | Mod: ,,, | Performed by: ADVANCED PRACTICE MIDWIFE

## 2023-07-17 PROCEDURE — 3074F SYST BP LT 130 MM HG: CPT | Mod: ,,, | Performed by: ADVANCED PRACTICE MIDWIFE

## 2023-07-17 PROCEDURE — 99402 PR PREVENT COUNSEL,INDIV,30 MIN: ICD-10-PCS | Mod: S$PBB,,, | Performed by: ADVANCED PRACTICE MIDWIFE

## 2023-07-17 RX ORDER — ESTRADIOL 0.1 MG/D
1 FILM, EXTENDED RELEASE TRANSDERMAL
Qty: 24 PATCH | Refills: 3 | Status: SHIPPED | OUTPATIENT
Start: 2023-07-17 | End: 2024-03-19 | Stop reason: SDUPTHER

## 2023-07-17 NOTE — PROGRESS NOTES
Subjective     Patient ID: Julian Lantigua is a 58 y.o. female.    Chief Complaint: Annual Exam (Last pap was in  with Dr. Bob. Denies any problems at this time.)    Patient here for annual exam. Last pap was in  with Dr. Bob. Mammogram nael'd in Aug with . Colonoscopy  , polyps repeat in 5 years. No complaints.     Gynecologic Exam  The patient's pertinent negatives include no genital itching, genital lesions, genital odor, genital rash, missed menses, pelvic pain, vaginal bleeding or vaginal discharge. She is not pregnant. Pertinent negatives include no abdominal pain, anorexia, back pain, chills, constipation, diarrhea, discolored urine, dysuria, fever, flank pain, frequency, headaches, hematuria, nausea, painful intercourse, rash, urgency or vomiting. She is sexually active. No, her partner does not have an STD. She is postmenopausal. Her past medical history is significant for an abdominal surgery, a gynecological surgery and menorrhagia. There is no history of a  section, an ectopic pregnancy, endometriosis, herpes simplex, metrorrhagia, miscarriage, ovarian cysts, perineal abscess, PID, an STD, a terminated pregnancy or vaginosis.   Review of Systems   Constitutional:  Negative for chills and fever.   Gastrointestinal:  Negative for abdominal pain, anorexia, constipation, diarrhea, nausea and vomiting.   Genitourinary:  Positive for menorrhagia. Negative for dysuria, flank pain, frequency, hematuria, missed menses, pelvic pain, urgency and vaginal discharge.   Musculoskeletal:  Negative for back pain.   Integumentary:  Negative for rash.   Neurological:  Negative for headaches.        Objective     Physical Exam  Vitals reviewed.   Constitutional:       Appearance: Normal appearance.   HENT:      Head: Normocephalic.   Cardiovascular:      Rate and Rhythm: Normal rate and regular rhythm.   Pulmonary:      Effort: Pulmonary effort is normal.      Breath sounds: Normal breath  sounds.   Chest:   Breasts:     Right: Normal. No mass.      Left: Normal. No mass.   Abdominal:      General: Abdomen is flat.      Palpations: Abdomen is soft.   Genitourinary:     General: Normal vulva.      Exam position: Lithotomy position.      Vagina: Normal.      Uterus: Absent. Not tender.       Adnexa: Right adnexa normal and left adnexa normal.        Right: No mass.          Left: No mass.        Rectum: Normal. No mass.   Musculoskeletal:         General: Normal range of motion.      Cervical back: Normal range of motion.   Skin:     General: Skin is warm and dry.   Neurological:      Mental Status: She is alert and oriented to person, place, and time.   Psychiatric:         Mood and Affect: Mood normal.         Behavior: Behavior normal.          Assessment and Plan     1. Routine gynecological examination  -     ThinPrep Pap Test    Other orders  -     estradioL (VIVELLE-DOT) 0.1 mg/24 hr PTSW; Place 1 patch onto the skin twice a week.  Dispense: 24 patch; Refill: 3        F/U in 1year or as needed

## 2023-07-19 LAB
GH SERPL-MCNC: NORMAL NG/ML
INSULIN SERPL-ACNC: NORMAL U[IU]/ML
LAB AP CLINICAL INFORMATION: NORMAL
LAB AP GYN INTERPRETATION: NEGATIVE
LAB AP PAP DISCLAIMER COMMENTS: NORMAL
RENIN PLAS-CCNC: NORMAL NG/ML/H

## 2023-07-21 LAB
HPV 16: NEGATIVE
HPV 18: NEGATIVE
HPV OTHER: NEGATIVE

## 2023-10-12 ENCOUNTER — HOSPITAL ENCOUNTER (OUTPATIENT)
Dept: RADIOLOGY | Facility: HOSPITAL | Age: 58
Discharge: HOME | End: 2023-10-12
Attending: RADIOLOGY
Payer: COMMERCIAL

## 2023-10-12 DIAGNOSIS — Z12.31 VISIT FOR SCREENING MAMMOGRAM: ICD-10-CM

## 2023-10-12 DIAGNOSIS — R92.8 ABNORMAL MAMMOGRAM: ICD-10-CM

## 2023-10-12 PROCEDURE — 77067 MAMMO DIGITAL SCREENING BILAT: ICD-10-PCS | Mod: 26,,, | Performed by: RADIOLOGY

## 2023-10-12 PROCEDURE — 77067 SCR MAMMO BI INCL CAD: CPT | Mod: 26,,, | Performed by: RADIOLOGY

## 2023-10-12 PROCEDURE — 77067 SCR MAMMO BI INCL CAD: CPT | Mod: TC

## 2023-10-17 ENCOUNTER — OFFICE VISIT (OUTPATIENT)
Dept: FAMILY MEDICINE | Facility: CLINIC | Age: 58
End: 2023-10-17
Payer: COMMERCIAL

## 2023-10-17 VITALS
SYSTOLIC BLOOD PRESSURE: 113 MMHG | HEIGHT: 68 IN | RESPIRATION RATE: 20 BRPM | WEIGHT: 174.63 LBS | OXYGEN SATURATION: 98 % | HEART RATE: 75 BPM | DIASTOLIC BLOOD PRESSURE: 75 MMHG | TEMPERATURE: 98 F | BODY MASS INDEX: 26.47 KG/M2

## 2023-10-17 DIAGNOSIS — Z13.1 SCREENING FOR DIABETES MELLITUS: ICD-10-CM

## 2023-10-17 DIAGNOSIS — E03.9 HYPOTHYROIDISM (ACQUIRED): ICD-10-CM

## 2023-10-17 DIAGNOSIS — Z13.220 SCREENING FOR LIPOID DISORDERS: ICD-10-CM

## 2023-10-17 DIAGNOSIS — Z79.899 ENCOUNTER FOR LONG-TERM (CURRENT) USE OF OTHER MEDICATIONS: ICD-10-CM

## 2023-10-17 DIAGNOSIS — Z00.00 ROUTINE GENERAL MEDICAL EXAMINATION AT A HEALTH CARE FACILITY: Primary | ICD-10-CM

## 2023-10-17 DIAGNOSIS — E03.9 HYPOTHYROIDISM (ACQUIRED): Chronic | ICD-10-CM

## 2023-10-17 LAB
ALBUMIN SERPL BCP-MCNC: 4 G/DL (ref 3.5–5)
ALBUMIN/GLOB SERPL: 1.3 {RATIO}
ALP SERPL-CCNC: 89 U/L (ref 50–130)
ALT SERPL W P-5'-P-CCNC: 20 U/L (ref 13–56)
ANION GAP SERPL CALCULATED.3IONS-SCNC: 7 MMOL/L (ref 7–16)
AST SERPL W P-5'-P-CCNC: 19 U/L (ref 15–37)
BASOPHILS # BLD AUTO: 0.05 K/UL (ref 0–0.2)
BASOPHILS NFR BLD AUTO: 1 % (ref 0–1)
BILIRUB SERPL-MCNC: 0.5 MG/DL (ref ?–1.2)
BUN SERPL-MCNC: 14 MG/DL (ref 7–18)
BUN/CREAT SERPL: 18 (ref 6–20)
CALCIUM SERPL-MCNC: 8.7 MG/DL (ref 8.5–10.1)
CHLORIDE SERPL-SCNC: 107 MMOL/L (ref 98–107)
CHOLEST SERPL-MCNC: 200 MG/DL (ref 0–200)
CHOLEST/HDLC SERPL: 2.7 {RATIO}
CO2 SERPL-SCNC: 29 MMOL/L (ref 21–32)
CREAT SERPL-MCNC: 0.76 MG/DL (ref 0.55–1.02)
DIFFERENTIAL METHOD BLD: ABNORMAL
EGFR (NO RACE VARIABLE) (RUSH/TITUS): 90 ML/MIN/1.73M2
EOSINOPHIL # BLD AUTO: 0.2 K/UL (ref 0–0.5)
EOSINOPHIL NFR BLD AUTO: 4 % (ref 1–4)
ERYTHROCYTE [DISTWIDTH] IN BLOOD BY AUTOMATED COUNT: 13.7 % (ref 11.5–14.5)
EST. AVERAGE GLUCOSE BLD GHB EST-MCNC: 87 MG/DL
GLOBULIN SER-MCNC: 3.1 G/DL (ref 2–4)
GLUCOSE SERPL-MCNC: 85 MG/DL (ref 74–106)
HBA1C MFR BLD HPLC: 5.2 % (ref 4.5–6.6)
HCT VFR BLD AUTO: 42.3 % (ref 38–47)
HDLC SERPL-MCNC: 74 MG/DL (ref 40–60)
HGB BLD-MCNC: 14.4 G/DL (ref 12–16)
IMM GRANULOCYTES # BLD AUTO: 0.02 K/UL (ref 0–0.04)
IMM GRANULOCYTES NFR BLD: 0.4 % (ref 0–0.4)
LDLC SERPL CALC-MCNC: 111 MG/DL
LDLC/HDLC SERPL: 1.5 {RATIO}
LYMPHOCYTES # BLD AUTO: 1.19 K/UL (ref 1–4.8)
LYMPHOCYTES NFR BLD AUTO: 23.8 % (ref 27–41)
MCH RBC QN AUTO: 30.6 PG (ref 27–31)
MCHC RBC AUTO-ENTMCNC: 34 G/DL (ref 32–36)
MCV RBC AUTO: 89.8 FL (ref 80–96)
MONOCYTES # BLD AUTO: 0.43 K/UL (ref 0–0.8)
MONOCYTES NFR BLD AUTO: 8.6 % (ref 2–6)
MPC BLD CALC-MCNC: 10.9 FL (ref 9.4–12.4)
NEUTROPHILS # BLD AUTO: 3.12 K/UL (ref 1.8–7.7)
NEUTROPHILS NFR BLD AUTO: 62.2 % (ref 53–65)
NONHDLC SERPL-MCNC: 126 MG/DL
NRBC # BLD AUTO: 0 X10E3/UL
NRBC, AUTO (.00): 0 %
PLATELET # BLD AUTO: 268 K/UL (ref 150–400)
POTASSIUM SERPL-SCNC: 4.4 MMOL/L (ref 3.5–5.1)
PROT SERPL-MCNC: 7.1 G/DL (ref 6.4–8.2)
RBC # BLD AUTO: 4.71 M/UL (ref 4.2–5.4)
SODIUM SERPL-SCNC: 139 MMOL/L (ref 136–145)
T4 FREE SERPL-MCNC: 1.02 NG/DL (ref 0.76–1.46)
TRIGL SERPL-MCNC: 76 MG/DL (ref 35–150)
TSH SERPL DL<=0.005 MIU/L-ACNC: 1.44 UIU/ML (ref 0.36–3.74)
VLDLC SERPL-MCNC: 15 MG/DL
WBC # BLD AUTO: 5.01 K/UL (ref 4.5–11)

## 2023-10-17 PROCEDURE — 1160F RVW MEDS BY RX/DR IN RCRD: CPT | Mod: ,,, | Performed by: NURSE PRACTITIONER

## 2023-10-17 PROCEDURE — 99396 PR PREVENTIVE VISIT,EST,40-64: ICD-10-PCS | Mod: ,,, | Performed by: NURSE PRACTITIONER

## 2023-10-17 PROCEDURE — 1159F MED LIST DOCD IN RCRD: CPT | Mod: ,,, | Performed by: NURSE PRACTITIONER

## 2023-10-17 PROCEDURE — 1159F PR MEDICATION LIST DOCUMENTED IN MEDICAL RECORD: ICD-10-PCS | Mod: ,,, | Performed by: NURSE PRACTITIONER

## 2023-10-17 PROCEDURE — 3078F DIAST BP <80 MM HG: CPT | Mod: ,,, | Performed by: NURSE PRACTITIONER

## 2023-10-17 PROCEDURE — 84439 ASSAY OF FREE THYROXINE: CPT | Mod: ,,, | Performed by: CLINICAL MEDICAL LABORATORY

## 2023-10-17 PROCEDURE — 1160F PR REVIEW ALL MEDS BY PRESCRIBER/CLIN PHARMACIST DOCUMENTED: ICD-10-PCS | Mod: ,,, | Performed by: NURSE PRACTITIONER

## 2023-10-17 PROCEDURE — 3074F PR MOST RECENT SYSTOLIC BLOOD PRESSURE < 130 MM HG: ICD-10-PCS | Mod: ,,, | Performed by: NURSE PRACTITIONER

## 2023-10-17 PROCEDURE — 3008F BODY MASS INDEX DOCD: CPT | Mod: ,,, | Performed by: NURSE PRACTITIONER

## 2023-10-17 PROCEDURE — 3008F PR BODY MASS INDEX (BMI) DOCUMENTED: ICD-10-PCS | Mod: ,,, | Performed by: NURSE PRACTITIONER

## 2023-10-17 PROCEDURE — 83036 HEMOGLOBIN GLYCOSYLATED A1C: CPT | Mod: ,,, | Performed by: CLINICAL MEDICAL LABORATORY

## 2023-10-17 PROCEDURE — 99396 PREV VISIT EST AGE 40-64: CPT | Mod: ,,, | Performed by: NURSE PRACTITIONER

## 2023-10-17 PROCEDURE — 83036 HEMOGLOBIN A1C: ICD-10-PCS | Mod: ,,, | Performed by: CLINICAL MEDICAL LABORATORY

## 2023-10-17 PROCEDURE — 80061 LIPID PANEL: ICD-10-PCS | Mod: ,,, | Performed by: CLINICAL MEDICAL LABORATORY

## 2023-10-17 PROCEDURE — 80050 PR  GENERAL HEALTH PANEL: ICD-10-PCS | Mod: ,,, | Performed by: CLINICAL MEDICAL LABORATORY

## 2023-10-17 PROCEDURE — 3078F PR MOST RECENT DIASTOLIC BLOOD PRESSURE < 80 MM HG: ICD-10-PCS | Mod: ,,, | Performed by: NURSE PRACTITIONER

## 2023-10-17 PROCEDURE — 84439 T4, FREE: ICD-10-PCS | Mod: ,,, | Performed by: CLINICAL MEDICAL LABORATORY

## 2023-10-17 PROCEDURE — 80050 GENERAL HEALTH PANEL: CPT | Mod: ,,, | Performed by: CLINICAL MEDICAL LABORATORY

## 2023-10-17 PROCEDURE — 80061 LIPID PANEL: CPT | Mod: ,,, | Performed by: CLINICAL MEDICAL LABORATORY

## 2023-10-17 PROCEDURE — 3074F SYST BP LT 130 MM HG: CPT | Mod: ,,, | Performed by: NURSE PRACTITIONER

## 2023-10-17 RX ORDER — LEVOTHYROXINE SODIUM 100 UG/1
100 TABLET ORAL DAILY
Qty: 90 TABLET | Refills: 3 | Status: SHIPPED | OUTPATIENT
Start: 2023-10-17

## 2023-10-17 NOTE — PROGRESS NOTES
[facility] FAMILY MEDICINE       PATIENT NAME: Julian Lantigua   : 1965    AGE: 58 y.o. DATE OF ENCOUNTER: 10/17/23    MRN: [id number]      Subjective     Patient ID: Julian Lantigua is a 58 y.o. female.    Chief Complaint: Healthy You (Patient presents to the clinic for a healthy you/)    HPI  Presents for healthy You wellness visit with no [facility] benefits, and routine f/u hypothyroidism.    Review of Systems   Constitutional:  Negative for activity change, chills, fatigue, fever and unexpected weight change.   HENT:  Negative for hearing loss, rhinorrhea and trouble swallowing.    Eyes:  Negative for discharge and visual disturbance.   Respiratory:  Negative for cough, chest tightness, shortness of breath and wheezing.    Cardiovascular:  Negative for chest pain, palpitations and leg swelling.   Gastrointestinal: Negative.  Negative for blood in stool, constipation, diarrhea and vomiting.   Endocrine: Negative for polydipsia and polyuria.   Genitourinary:  Negative for difficulty urinating, dysuria, hematuria and menstrual problem.   Musculoskeletal:  Negative for arthralgias, joint swelling and neck pain.   Neurological: Negative.  Negative for weakness and headaches.   Psychiatric/Behavioral: Negative.  Negative for confusion and dysphoric mood.        Tobacco Use: Low Risk  (10/17/2023)    Patient History     Smoking Tobacco Use: Never     Smokeless Tobacco Use: Never     Passive Exposure: Not on file     Review of patient's allergies indicates:   Allergen Reactions    Ceftin [cefuroxime axetil] Nausea And Vomiting    Macrobid [nitrofurantoin monohyd/m-cryst] Hives and Swelling     Current Outpatient Medications   Medication Instructions    estradioL (VIVELLE-DOT) 0.1 mg/24 hr PTSW 1 patch, Transdermal, Twice weekly    levothyroxine (SYNTHROID) 100 mcg, Oral, Daily    multivit-min/ferrous fumarate (MULTI VITAMIN ORAL) 1 tablet, Oral, Daily     There are no discontinued  "medications.    Past Medical History:   Diagnosis Date    Encounter for long-term (current) use of other medications 09/25/2017    Epicondylitis, lateral, left 01/19/2017    Hypothyroidism 04/15/2016    Menopausal flushing 11/03/2016    Personal history of colonic polyps 05/10/2016    Dr. Wendie YUNG (postoperative nausea and vomiting)      Health Maintenance Topics with due status: Not Due       Topic Last Completion Date    Lipid Panel 10/10/2022    Colorectal Cancer Screening 01/23/2023    Mammogram 10/12/2023     Immunization History   Administered Date(s) Administered    COVID-19, MRNA, LN-S, PF (MODERNA FULL 0.5 ML DOSE) 01/26/2021, 02/26/2021    Influenza 11/02/2021       Objective     Body mass index is 26.55 kg/m².  Wt Readings from Last 3 Encounters:   10/17/23 79.2 kg (174 lb 9.6 oz)   07/17/23 79.4 kg (175 lb)   01/23/23 81.2 kg (179 lb)     Ht Readings from Last 3 Encounters:   10/17/23 5' 8" (1.727 m)   07/17/23 5' 8" (1.727 m)   10/10/22 5' 8" (1.727 m)     BP Readings from Last 3 Encounters:   10/17/23 113/75   07/17/23 116/70   01/23/23 118/69     Temp Readings from Last 3 Encounters:   10/17/23 98 °F (36.7 °C) (Oral)   01/23/23 97.7 °F (36.5 °C) (Skin)   10/10/22 98.5 °F (36.9 °C) (Oral)     Pulse Readings from Last 3 Encounters:   10/17/23 75   07/17/23 88   01/23/23 (!) 58     Resp Readings from Last 3 Encounters:   10/17/23 20   07/17/23 19   01/23/23 12     PF Readings from Last 3 Encounters:   No data found for PF       Physical Exam  Vitals and nursing note reviewed.   Constitutional:       General: She is not in acute distress.     Appearance: Normal appearance.   HENT:      Head: Normocephalic.      Right Ear: Tympanic membrane, ear canal and external ear normal.      Left Ear: Tympanic membrane, ear canal and external ear normal.      Nose: Nose normal.      Mouth/Throat:      Mouth: Mucous membranes are moist.      Pharynx: Oropharynx is clear.   Eyes:      Conjunctiva/sclera: " Conjunctivae normal.      Pupils: Pupils are equal, round, and reactive to light.   Neck:      Thyroid: No thyroid mass or thyromegaly.      Vascular: Normal carotid pulses. No carotid bruit.   Cardiovascular:      Rate and Rhythm: Normal rate and regular rhythm.      Pulses: Normal pulses.      Heart sounds: Normal heart sounds.   Pulmonary:      Effort: Pulmonary effort is normal.      Breath sounds: Normal breath sounds.   Abdominal:      Palpations: Abdomen is soft.      Tenderness: There is no abdominal tenderness.   Musculoskeletal:      Cervical back: Neck supple.      Right lower leg: No edema.      Left lower leg: No edema.   Lymphadenopathy:      Cervical: No cervical adenopathy.   Skin:     General: Skin is warm and dry.   Neurological:      General: No focal deficit present.      Mental Status: She is alert and oriented to person, place, and time.   Psychiatric:         Mood and Affect: Mood normal.         Behavior: Behavior normal.     Assessment and Plan     1. Routine general medical examination at a health care facility    2. Screening for diabetes mellitus  -     Hemoglobin A1C; Future; Expected date: 10/17/2023    3. Screening for lipoid disorders  -     Lipid Panel; Future; Expected date: 10/17/2023    4. Hypothyroidism (acquired)  -     TSH; Future; Expected date: 10/17/2023  -     T4, Free; Future; Expected date: 10/17/2023    5. Encounter for long-term (current) use of other medications  -     CBC Auto Differential; Future; Expected date: 10/17/2023  -     Comprehensive Metabolic Panel; Future; Expected date: 10/17/2023  -     Hemoglobin A1C; Future; Expected date: 10/17/2023       Plan:   HY screening lipids plus other needed labs for long term monitoring  Wants to wait until later to get Shingrix.  Gets flu shot at work.  Will send levothyroxine refills after review of lab.        I have reviewed the medications, allergies, and problem list.     Goal Actions:    What type of visit is the  patient here for today?: Healthy You  Does the patient consent to enroll in [facility] Healthy?:  (n/a)  Is this a Wellness Follow Up?: No  What is your overall wellness goal? (select at least one): Lifestyle modifications  Choose 3: Lifestyle, Nutrition, Exercise  Lifestyle Actions : Take medications as prescribed  Nurtrition Actions: Eat a well-balanced diet  Exercise Actions: Recommend physical activity 30 minutes per day 3-5 times/week    F/u HY 1 yr with fasting labs; f/u as needed.    Frances NAYLOR

## 2023-10-18 ENCOUNTER — PATIENT OUTREACH (OUTPATIENT)
Dept: ADMINISTRATIVE | Facility: HOSPITAL | Age: 58
End: 2023-10-18

## 2023-10-18 NOTE — PROGRESS NOTES
Post visit Population Health review of encounter with date of service  10/17/23 with [place].  All required HY components in encounter.  Tyler Run  Followup appt for:  10/21/2024 HY

## 2024-03-20 RX ORDER — ESTRADIOL 0.1 MG/D
1 FILM, EXTENDED RELEASE TRANSDERMAL
Qty: 24 PATCH | Refills: 1 | Status: SHIPPED | OUTPATIENT
Start: 2024-03-21

## 2024-09-24 ENCOUNTER — OFFICE VISIT (OUTPATIENT)
Dept: OBSTETRICS AND GYNECOLOGY | Facility: CLINIC | Age: 59
End: 2024-09-24
Payer: COMMERCIAL

## 2024-09-24 VITALS
HEIGHT: 68 IN | OXYGEN SATURATION: 100 % | DIASTOLIC BLOOD PRESSURE: 80 MMHG | RESPIRATION RATE: 18 BRPM | HEART RATE: 70 BPM | WEIGHT: 180 LBS | BODY MASS INDEX: 27.28 KG/M2 | SYSTOLIC BLOOD PRESSURE: 120 MMHG

## 2024-09-24 DIAGNOSIS — N95.1 MENOPAUSAL SYMPTOM: ICD-10-CM

## 2024-09-24 DIAGNOSIS — Z01.419 ROUTINE GYNECOLOGICAL EXAMINATION: Primary | ICD-10-CM

## 2024-09-24 PROCEDURE — 99396 PREV VISIT EST AGE 40-64: CPT | Mod: S$PBB,,, | Performed by: ADVANCED PRACTICE MIDWIFE

## 2024-09-24 PROCEDURE — 1159F MED LIST DOCD IN RCRD: CPT | Mod: ,,, | Performed by: ADVANCED PRACTICE MIDWIFE

## 2024-09-24 PROCEDURE — 3079F DIAST BP 80-89 MM HG: CPT | Mod: ,,, | Performed by: ADVANCED PRACTICE MIDWIFE

## 2024-09-24 PROCEDURE — 99213 OFFICE O/P EST LOW 20 MIN: CPT | Mod: PBBFAC | Performed by: ADVANCED PRACTICE MIDWIFE

## 2024-09-24 PROCEDURE — 99999 PR PBB SHADOW E&M-EST. PATIENT-LVL III: CPT | Mod: PBBFAC,,, | Performed by: ADVANCED PRACTICE MIDWIFE

## 2024-09-24 PROCEDURE — 3074F SYST BP LT 130 MM HG: CPT | Mod: ,,, | Performed by: ADVANCED PRACTICE MIDWIFE

## 2024-09-24 PROCEDURE — 3008F BODY MASS INDEX DOCD: CPT | Mod: ,,, | Performed by: ADVANCED PRACTICE MIDWIFE

## 2024-09-24 RX ORDER — ESTRADIOL 0.1 MG/D
1 FILM, EXTENDED RELEASE TRANSDERMAL
Qty: 24 PATCH | Refills: 3 | Status: SHIPPED | OUTPATIENT
Start: 2024-09-26 | End: 2025-09-26

## 2024-09-24 RX ORDER — PHENTERMINE HYDROCHLORIDE 37.5 MG/1
37.5 TABLET ORAL
COMMUNITY
Start: 2024-08-22

## 2024-09-24 NOTE — PROGRESS NOTES
Subjective     Patient ID: Julian Lantigua is a 59 y.o. female.    Chief Complaint: Gynecologic Exam ( Denies any problems at this time. Pts last pap was in  WNL./Pt has MMG nael for 10/31/24.)    Last pap 2023 WNL  Denies any complaints or problems   MMG scheduled.   Colonoscopy UTD     Gynecologic Exam  The patient's pertinent negatives include no pelvic pain or vaginal discharge. Pertinent negatives include no dysuria, frequency or urgency.     Review of Systems   Constitutional: Negative.    HENT: Negative.     Eyes: Negative.    Respiratory: Negative.     Cardiovascular: Negative.    Gastrointestinal: Negative.    Endocrine: Negative.    Genitourinary: Negative.  Negative for dyspareunia, dysuria, frequency, pelvic pain, urgency, vaginal bleeding and vaginal discharge.   Musculoskeletal: Negative.    Integumentary:  Negative.   Allergic/Immunologic: Negative.    Neurological: Negative.    Psychiatric/Behavioral: Negative.       Past Medical History:   Diagnosis Date    Encounter for long-term (current) use of other medications 2017    Epicondylitis, lateral, left 2017    Hypothyroidism 04/15/2016    Menopausal flushing 2016    Personal history of colonic polyps 05/10/2016    Dr. Wendie YUNG (postoperative nausea and vomiting)      Past Surgical History:   Procedure Laterality Date    APPENDECTOMY      COLONOSCOPY  05/10/2016    HYSTERECTOMY  2004    Partial    TUBAL LIGATION        OB History    Para Term  AB Living   1 1 1         SAB IAB Ectopic Multiple Live Births                  # Outcome Date GA Lbr Mp/2nd Weight Sex Type Anes PTL Lv   1 Term               Current Outpatient Medications   Medication Instructions    [START ON 2024] estradioL (VIVELLE-DOT) 0.1 mg/24 hr PTSW 1 patch, Transdermal, Twice weekly    levothyroxine (SYNTHROID) 100 mcg, Oral, Daily    multivit-min/ferrous fumarate (MULTI VITAMIN ORAL) 1 tablet, Oral, Daily    phentermine (ADIPEX-P)  37.5 mg, Oral         Objective   Vitals:    09/24/24 1317   BP: 120/80   Pulse: 70   Resp: 18     Wt Readings from Last 2 Encounters:   09/24/24 81.6 kg (180 lb)   10/17/23 79.2 kg (174 lb 9.6 oz)      Physical Exam  Vitals reviewed. Exam conducted with a chaperone present.   Constitutional:       Appearance: Normal appearance.   HENT:      Head: Normocephalic.   Cardiovascular:      Rate and Rhythm: Normal rate and regular rhythm.   Pulmonary:      Effort: Pulmonary effort is normal.      Breath sounds: Normal breath sounds.   Chest:   Breasts:     Right: Normal. No mass.      Left: Normal. No mass.   Abdominal:      General: Abdomen is flat.      Palpations: Abdomen is soft.   Genitourinary:     General: Normal vulva.      Exam position: Lithotomy position.      Vagina: Normal.      Uterus: Absent. Not tender.       Adnexa: Right adnexa normal and left adnexa normal.        Right: No mass.          Left: No mass.     Musculoskeletal:         General: Normal range of motion.      Cervical back: Normal range of motion.   Skin:     General: Skin is warm and dry.   Neurological:      Mental Status: She is alert and oriented to person, place, and time.   Psychiatric:         Mood and Affect: Mood normal.         Behavior: Behavior normal.        Assessment and Plan   1. Routine gynecological examination  -     estradioL (VIVELLE-DOT) 0.1 mg/24 hr PTSW; Place 1 patch onto the skin twice a week.  Dispense: 24 patch; Refill: 3    2. Menopausal symptom  -     estradioL (VIVELLE-DOT) 0.1 mg/24 hr PTSW; Place 1 patch onto the skin twice a week.  Dispense: 24 patch; Refill: 3    RX Vivelle-Dot sent to pharmacy        Follow up in about 1 year (around 9/24/2025), or if symptoms worsen or fail to improve, for Annual Exam.

## 2024-09-30 DIAGNOSIS — M25.562 LEFT KNEE PAIN, UNSPECIFIED CHRONICITY: Primary | ICD-10-CM

## 2024-10-01 ENCOUNTER — HOSPITAL ENCOUNTER (OUTPATIENT)
Dept: RADIOLOGY | Facility: HOSPITAL | Age: 59
Discharge: HOME | End: 2024-10-01
Attending: ORTHOPAEDIC SURGERY
Payer: COMMERCIAL

## 2024-10-01 ENCOUNTER — OFFICE VISIT (OUTPATIENT)
Dept: ORTHOPEDICS | Facility: CLINIC | Age: 59
End: 2024-10-01
Payer: COMMERCIAL

## 2024-10-01 DIAGNOSIS — M25.562 LEFT KNEE PAIN, UNSPECIFIED CHRONICITY: ICD-10-CM

## 2024-10-01 DIAGNOSIS — M17.12 PRIMARY OSTEOARTHRITIS OF LEFT KNEE: Primary | ICD-10-CM

## 2024-10-01 PROCEDURE — 73564 X-RAY EXAM KNEE 4 OR MORE: CPT | Mod: TC,LT

## 2024-10-01 PROCEDURE — 99212 OFFICE O/P EST SF 10 MIN: CPT | Mod: PBBFAC,25 | Performed by: ORTHOPAEDIC SURGERY

## 2024-10-01 PROCEDURE — 73564 X-RAY EXAM KNEE 4 OR MORE: CPT | Mod: 26,LT,, | Performed by: ORTHOPAEDIC SURGERY

## 2024-10-01 PROCEDURE — 99203 OFFICE O/P NEW LOW 30 MIN: CPT | Mod: S$PBB,,, | Performed by: ORTHOPAEDIC SURGERY

## 2024-10-01 PROCEDURE — 99999 PR PBB SHADOW E&M-EST. PATIENT-LVL II: CPT | Mod: PBBFAC,,, | Performed by: ORTHOPAEDIC SURGERY

## 2024-10-01 RX ORDER — MELOXICAM 7.5 MG/1
7.5 TABLET ORAL DAILY
Qty: 30 TABLET | Refills: 1 | Status: SHIPPED | OUTPATIENT
Start: 2024-10-01

## 2024-10-01 NOTE — PROGRESS NOTES
CC:   Chief Complaint   Patient presents with    Left Knee - Pain        PREVIOUS INFO:        HISTORY:   10/1/2024    Julian Lantigua  is a 59 y.o. comes in with left knee pain aches at night aches with use she occasionally takes and a half below her leave      PAST MEDICAL HISTORY:   Past Medical History:   Diagnosis Date    Encounter for long-term (current) use of other medications 09/25/2017    Epicondylitis, lateral, left 01/19/2017    Hypothyroidism 04/15/2016    Menopausal flushing 11/03/2016    Personal history of colonic polyps 05/10/2016    Dr. Wendie YUNG (postoperative nausea and vomiting)           PAST SURGICAL HISTORY:   Past Surgical History:   Procedure Laterality Date    APPENDECTOMY      COLONOSCOPY  05/10/2016    HYSTERECTOMY  2004    Partial    TUBAL LIGATION            ALLERGIES:   Review of patient's allergies indicates:   Allergen Reactions    Ceftin [cefuroxime axetil] Nausea And Vomiting    Macrobid [nitrofurantoin monohyd/m-cryst] Hives and Swelling        MEDICATIONS :    Current Outpatient Medications:     estradioL (VIVELLE-DOT) 0.1 mg/24 hr PTSW, Place 1 patch onto the skin twice a week., Disp: 24 patch, Rfl: 3    levothyroxine (SYNTHROID) 100 MCG tablet, Take 1 tablet (100 mcg total) by mouth once daily., Disp: 90 tablet, Rfl: 3    phentermine (ADIPEX-P) 37.5 mg tablet, Take 37.5 mg by mouth., Disp: , Rfl:      SOCIAL HISTORY:   Social History     Socioeconomic History    Marital status:      Spouse name: Waylon Reagan    Number of children: 1   Tobacco Use    Smoking status: Never    Smokeless tobacco: Never   Substance and Sexual Activity    Alcohol use: Not Currently    Drug use: Never    Sexual activity: Yes     Partners: Male     Birth control/protection: None        ROS    FAMILY HISTORY:   Family History   Problem Relation Name Age of Onset    Lung cancer Mother mother     Cancer Mother mother         lung cancer    Cancer Father father          appendix/abdominal cancer    Thyroid disease Father father     Diabetes Brother brother     No Known Problems Maternal Grandmother      Cancer Maternal Grandfather maternal grandfather         bone cancer    Diabetes Paternal Grandmother paternal grandmother     No Known Problems Paternal Grandfather      Thyroid disease Daughter            PHYSICAL EXAM: There were no vitals filed for this visit.            There is no height or weight on file to calculate BMI.     In general, this is a well-developed, well-nourished female . The patient is alert, oriented and cooperative.      HEENT:  Normocephalic, atraumatic.  Extraocular movements are intact bilaterally.  The oropharynx is benign.       NECK:  Nontender with good range of motion.      PULMONARY: Respirations are even and non-labored.       CARDIOVASCULAR: Pulses regular by peripheral palpation.       ABDOMEN:  Soft, non-tender, non-distended.        EXTREMITIES:  Palpable posterior tib pulse there is no effusion she has mild mediolateral joint line tenderness today to palpation her motion is 0 to about 140 some pain but not significant day    Ortho Exam      RADIOGRAPHIC FINDINGS:  Left knee standing x-rays with the sunrise view total 4 films sclerotic edges medially with bone-on-bone no fracture dislocations appreciated end-stage DJD left knee      .      IMPRESSION:  Left knee end-stage DJD her symptoms do not seem severe enough to recommend knee were total knee replacement I do not think arthroscopy was him any benefit here    PLAN:  We will place her on Mobic only can take yet not nothing not any other anti-inflammatories can take Tylenol if it bothers her stomach stop it give her a follow-up in a month if no better        No follow-ups on file.         Bradley Hartley III      (Subject to voice recognition error, transcription service not allowed)

## 2025-03-07 ENCOUNTER — PATIENT OUTREACH (OUTPATIENT)
Facility: HOSPITAL | Age: 60
End: 2025-03-07
Payer: COMMERCIAL

## 2025-03-07 NOTE — PROGRESS NOTES
Population Health Chart Review & Patient Outreach Details  Per [facility] website, insurance is active and pt is listed on the attributed list needing a healthy you performed in   HY scheduled for 3/11/2025    Further Action Needed If Patient Returns Outreach:            Updates Requested / Reviewed:     []  Care Everywhere    []     []  External Sources ([facility], [facility], [facility], etc.)    [] [facility]   [] [facility]   [] Other:    []  Care Team Updated   []  Removed  or Duplicate Orders   []  Immunization Reconciliation Completed / Queried    [] [place]   [] [place]   [] [place]   [] [place]      Health Maintenance Topics Addressed and Outreach Outcomes / Actions Taken:             Breast Cancer Screening []  Mammogram Order Placed    []  Mammogram Screening Scheduled    []  External Records Requested & Care Team Updated if Applicable    []  External Records Uploaded & Care Team Updated if Applicable    []  Pt Declined Scheduling Mammogram    []  Pt Will Schedule with External Provider / Order Routed & Care Team Updated if Applicable              Cervical Cancer Screening []  Pap Smear Scheduled in Primary Care or OBGYN    []  External Records Requested & Care Team Updated if Applicable       []  External Records Uploaded, Care Team Updated, & History Updated if Applicable    []  Patient Declined Scheduling Pap Smear    []  Patient Will Schedule with External Provider & Care Team Updated if Applicable                  Colorectal Cancer Screening []  Colonoscopy Case Request / Referral / Home Test Order Placed    []  External Records Requested & Care Team Updated if Applicable    []  External Records Uploaded, Care Team Updated, & History Updated if Applicable    []  Patient Declined Completing Colon Cancer Screening    []  Patient Will Schedule with External Provider & Care Team Updated if Applicable    []  Fit Kit Mailed (add the SmartPhrase under additional notes)    []  Reminded Patient to Complete  Home Test                Diabetic Eye Exam []  Eye Exam Screening Order Placed    []  Eye Camera Scheduled or Optometry/Ophthalmology Referral Placed    []  External Records Requested & Care Team Updated if Applicable    []  External Records Uploaded, Care Team Updated, & History Updated if Applicable    []  Patient Declined Scheduling Eye Exam    []  Patient Will Schedule with External Provider & Care Team Updated if Applicable             Blood Pressure Control []  Primary Care Follow Up Visit Scheduled     []  Remote Blood Pressure Reading Captured    []  Patient Declined Remote Reading or Scheduling Appt - Escalated to PCP    []  Patient Will Call Back or Send Portal Message with Reading                 HbA1c & Other Labs []  Overdue Lab(s) Ordered    []  Overdue Lab(s) Scheduled    []  External Records Uploaded & Care Team Updated if Applicable    []  Primary Care Follow Up Visit Scheduled     []  Reminded Patient to Complete A1c Home Test    []  Patient Declined Scheduling Labs or Will Call Back to Schedule    []  Patient Will Schedule with External Provider / Order Routed, & Care Team Updated if Applicable           Primary Care Appointment []  Primary Care Appt Scheduled    []  Patient Declined Scheduling or Will Call Back to Schedule    []  Pt Established with External Provider, Updated Care Team, & Informed Pt to Notify Payor if Applicable           Medication Adherence /    Statin Use []  Primary Care Appointment Scheduled    []  Patient Reminded to  Prescription    []  Patient Declined, Provider Notified if Needed    []  Sent Provider Message to Review to Evaluate Pt for Statin, Add Exclusion Dx Codes, Document   Exclusion in Problem List, Change Statin Intensity Level to Moderate or High Intensity if Applicable                Osteoporosis Screening []  Dexa Order Placed    []  Dexa Appointment Scheduled    []  External Records Requested & Care Team Updated    []  External Records Uploaded, Care  Team Updated, & History Updated if Applicable    []  Patient Declined Scheduling Dexa or Will Call Back to Schedule    []  Patient Will Schedule with External Provider / Order Routed & Care Team Updated if Applicable       Additional Notes:

## 2025-03-11 ENCOUNTER — OFFICE VISIT (OUTPATIENT)
Dept: FAMILY MEDICINE | Facility: CLINIC | Age: 60
End: 2025-03-11
Payer: COMMERCIAL

## 2025-03-11 VITALS
TEMPERATURE: 98 F | HEART RATE: 76 BPM | OXYGEN SATURATION: 100 % | BODY MASS INDEX: 26.67 KG/M2 | RESPIRATION RATE: 20 BRPM | SYSTOLIC BLOOD PRESSURE: 126 MMHG | HEIGHT: 68 IN | DIASTOLIC BLOOD PRESSURE: 81 MMHG | WEIGHT: 176 LBS

## 2025-03-11 DIAGNOSIS — Z79.899 OTHER LONG TERM (CURRENT) DRUG THERAPY: ICD-10-CM

## 2025-03-11 DIAGNOSIS — E55.9 VITAMIN D DEFICIENCY: ICD-10-CM

## 2025-03-11 DIAGNOSIS — E03.9 HYPOTHYROIDISM (ACQUIRED): Chronic | ICD-10-CM

## 2025-03-11 DIAGNOSIS — Z00.00 ROUTINE GENERAL MEDICAL EXAMINATION AT A HEALTH CARE FACILITY: Primary | ICD-10-CM

## 2025-03-11 DIAGNOSIS — Z13.1 DIABETES MELLITUS SCREENING: ICD-10-CM

## 2025-03-11 DIAGNOSIS — Z13.220 SCREENING FOR HYPERLIPIDEMIA: ICD-10-CM

## 2025-03-11 LAB
25(OH)D3 SERPL-MCNC: 37 NG/ML (ref 30–80)
ALBUMIN SERPL BCP-MCNC: 4.1 G/DL (ref 3.4–4.8)
ALBUMIN/GLOB SERPL: 1.4 {RATIO}
ALP SERPL-CCNC: 83 U/L (ref 40–150)
ALT SERPL W P-5'-P-CCNC: 14 U/L
ANION GAP SERPL CALCULATED.3IONS-SCNC: 12 MMOL/L (ref 7–16)
AST SERPL W P-5'-P-CCNC: 24 U/L (ref 11–45)
BASOPHILS # BLD AUTO: 0.04 K/UL (ref 0–0.2)
BASOPHILS NFR BLD AUTO: 0.8 % (ref 0–1)
BILIRUB SERPL-MCNC: 0.6 MG/DL
BUN SERPL-MCNC: 12 MG/DL (ref 10–20)
BUN/CREAT SERPL: 15 (ref 6–20)
CALCIUM SERPL-MCNC: 9.2 MG/DL (ref 8.4–10.2)
CHLORIDE SERPL-SCNC: 102 MMOL/L (ref 98–107)
CHOLEST SERPL-MCNC: 192 MG/DL
CHOLEST/HDLC SERPL: 2.9 {RATIO}
CO2 SERPL-SCNC: 28 MMOL/L (ref 23–31)
CREAT SERPL-MCNC: 0.81 MG/DL (ref 0.55–1.02)
DIFFERENTIAL METHOD BLD: ABNORMAL
EGFR (NO RACE VARIABLE) (RUSH/TITUS): 82 ML/MIN/1.73M2
EOSINOPHIL # BLD AUTO: 0.21 K/UL (ref 0–0.5)
EOSINOPHIL NFR BLD AUTO: 4.2 % (ref 1–4)
ERYTHROCYTE [DISTWIDTH] IN BLOOD BY AUTOMATED COUNT: 13.5 % (ref 11.5–14.5)
GLOBULIN SER-MCNC: 3 G/DL (ref 2–4)
GLUCOSE SERPL-MCNC: 80 MG/DL (ref 82–115)
HCT VFR BLD AUTO: 43.4 % (ref 38–47)
HDLC SERPL-MCNC: 67 MG/DL (ref 35–60)
HGB BLD-MCNC: 13.8 G/DL (ref 12–16)
IMM GRANULOCYTES # BLD AUTO: 0.01 K/UL (ref 0–0.04)
IMM GRANULOCYTES NFR BLD: 0.2 % (ref 0–0.4)
LDLC SERPL CALC-MCNC: 101 MG/DL
LDLC/HDLC SERPL: 1.5 {RATIO}
LYMPHOCYTES # BLD AUTO: 1.23 K/UL (ref 1–4.8)
LYMPHOCYTES NFR BLD AUTO: 24.8 % (ref 27–41)
MCH RBC QN AUTO: 29.1 PG (ref 27–31)
MCHC RBC AUTO-ENTMCNC: 31.8 G/DL (ref 32–36)
MCV RBC AUTO: 91.6 FL (ref 80–96)
MONOCYTES # BLD AUTO: 0.47 K/UL (ref 0–0.8)
MONOCYTES NFR BLD AUTO: 9.5 % (ref 2–6)
MPC BLD CALC-MCNC: 10.3 FL (ref 9.4–12.4)
NEUTROPHILS # BLD AUTO: 2.99 K/UL (ref 1.8–7.7)
NEUTROPHILS NFR BLD AUTO: 60.5 % (ref 53–65)
NONHDLC SERPL-MCNC: 125 MG/DL
NRBC # BLD AUTO: 0 X10E3/UL
NRBC, AUTO (.00): 0 %
PLATELET # BLD AUTO: 311 K/UL (ref 150–400)
POTASSIUM SERPL-SCNC: 4.3 MMOL/L (ref 3.5–5.1)
PROT SERPL-MCNC: 7.1 G/DL (ref 5.8–7.6)
RBC # BLD AUTO: 4.74 M/UL (ref 4.2–5.4)
SODIUM SERPL-SCNC: 138 MMOL/L (ref 136–145)
TRIGL SERPL-MCNC: 118 MG/DL (ref 37–140)
TSH SERPL DL<=0.005 MIU/L-ACNC: 0.52 UIU/ML (ref 0.35–4.94)
VLDLC SERPL-MCNC: 24 MG/DL
WBC # BLD AUTO: 4.95 K/UL (ref 4.5–11)

## 2025-03-11 PROCEDURE — 82306 VITAMIN D 25 HYDROXY: CPT | Mod: ,,, | Performed by: CLINICAL MEDICAL LABORATORY

## 2025-03-11 PROCEDURE — 99396 PREV VISIT EST AGE 40-64: CPT | Mod: ,,, | Performed by: NURSE PRACTITIONER

## 2025-03-11 PROCEDURE — 3008F BODY MASS INDEX DOCD: CPT | Mod: ,,, | Performed by: NURSE PRACTITIONER

## 2025-03-11 PROCEDURE — 3079F DIAST BP 80-89 MM HG: CPT | Mod: ,,, | Performed by: NURSE PRACTITIONER

## 2025-03-11 PROCEDURE — 80050 GENERAL HEALTH PANEL: CPT | Mod: ,,, | Performed by: CLINICAL MEDICAL LABORATORY

## 2025-03-11 PROCEDURE — 1159F MED LIST DOCD IN RCRD: CPT | Mod: ,,, | Performed by: NURSE PRACTITIONER

## 2025-03-11 PROCEDURE — 1160F RVW MEDS BY RX/DR IN RCRD: CPT | Mod: ,,, | Performed by: NURSE PRACTITIONER

## 2025-03-11 PROCEDURE — 80061 LIPID PANEL: CPT | Mod: ,,, | Performed by: CLINICAL MEDICAL LABORATORY

## 2025-03-11 PROCEDURE — 3074F SYST BP LT 130 MM HG: CPT | Mod: ,,, | Performed by: NURSE PRACTITIONER

## 2025-03-11 RX ORDER — LEVOTHYROXINE SODIUM 100 UG/1
100 TABLET ORAL DAILY
Qty: 90 TABLET | Refills: 3 | Status: CANCELLED | OUTPATIENT
Start: 2025-03-11

## 2025-03-11 NOTE — PROGRESS NOTES
[facility]  [address] [place] [address]  Phone: [phone]  Fax: [phone]     PATIENT NAME: Julian Lantigua   : 1965    AGE: 60 y.o. DATE OF ENCOUNTER: 3/11/25    Provider:    N: [id number]      Subjective     Patient ID: Julian Lantigua is a 60 y.o. female.    Chief Complaint: Healthy You (Pt presents to the clinic for a Healthy You) and Health Maintenance (Pt declines all care gaps today)    HPI  Healthy you wellness visit and routine yearly f/u hypothyroidism  History of Present Illness    HPI:  Patient is a 60-year-old female who has not been seen since 2023, having missed her checkup last year. She reports sinus congestion for most of February, which lasted about 3 weeks. She is currently taking levothyroxine 100 mcg daily for hypothyroidism. There was some confusion about her medication adherence, but she confirms she still has medication left. Her thyroid levels typically do not fluctuate. She has a mammogram scheduled for 2025. She denies chest pain, shortness of breath, and a history of hypertension.          Review of Systems   Constitutional:  Negative for activity change and unexpected weight change.   HENT:  Negative for hearing loss, rhinorrhea and trouble swallowing.    Eyes:  Negative for discharge and visual disturbance.   Respiratory:  Negative for chest tightness and wheezing.    Cardiovascular:  Negative for chest pain and palpitations.   Gastrointestinal:  Negative for blood in stool, constipation, diarrhea and vomiting.   Endocrine: Negative for polydipsia and polyuria.   Genitourinary:  Negative for difficulty urinating, dysuria, hematuria and menstrual problem.   Musculoskeletal:  Negative for arthralgias, joint swelling and neck pain.   Neurological:  Negative for weakness and headaches.   Psychiatric/Behavioral:  Negative for confusion and dysphoric mood.        Tobacco Use: Low Risk  (3/11/2025)    Patient History  "    Smoking Tobacco Use: Never     Smokeless Tobacco Use: Never     Passive Exposure: Not on file     Review of patient's allergies indicates:   Allergen Reactions    Ceftin [cefuroxime axetil] Nausea And Vomiting    Macrobid [nitrofurantoin monohyd/m-cryst] Hives and Swelling     Current Outpatient Medications   Medication Instructions    estradioL (VIVELLE-DOT) 0.1 mg/24 hr PTSW 1 patch, Transdermal, Twice weekly    levothyroxine (SYNTHROID) 100 mcg, Oral, Daily     Medications Discontinued During This Encounter   Medication Reason    meloxicam (MOBIC) 7.5 MG tablet Patient no longer taking    phentermine (ADIPEX-P) 37.5 mg tablet Patient no longer taking       Past Medical History:   Diagnosis Date    Encounter for long-term (current) use of other medications 09/25/2017    Epicondylitis, lateral, left 01/19/2017    Hypothyroidism 04/15/2016    Menopausal flushing 11/03/2016    Personal history of colonic polyps 05/10/2016    Dr. Wendie YUNG (postoperative nausea and vomiting)      Health Maintenance Topics with due status: Not Due       Topic Last Completion Date    Colorectal Cancer Screening 01/23/2023    Hemoglobin A1c (Diabetic Prevention Screening) 10/17/2023    Lipid Panel 10/17/2023    RSV Vaccine (Age 60+ and Pregnant patients) Not Due     Immunization History   Administered Date(s) Administered    COVID-19, MRNA, LN-S, PF (MODERNA FULL 0.5 ML DOSE) 01/26/2021, 02/26/2021    Influenza 11/02/2021    Influenza - Trivalent - Fluarix, Flulaval, Fluzone, Afluria - PF 11/11/2024       Objective     Body mass index is 26.76 kg/m².  Wt Readings from Last 3 Encounters:   03/11/25 79.8 kg (176 lb)   09/24/24 81.6 kg (180 lb)   10/17/23 79.2 kg (174 lb 9.6 oz)     Ht Readings from Last 3 Encounters:   03/11/25 5' 8" (1.727 m)   09/24/24 5' 8" (1.727 m)   10/17/23 5' 8" (1.727 m)     BP Readings from Last 3 Encounters:   03/11/25 126/81   09/24/24 120/80   10/17/23 113/75     Temp Readings from Last 3 Encounters: "   03/11/25 97.7 °F (36.5 °C) (Oral)   10/17/23 98 °F (36.7 °C) (Oral)   01/23/23 97.7 °F (36.5 °C) (Skin)     Pulse Readings from Last 3 Encounters:   03/11/25 76   09/24/24 70   10/17/23 75     Resp Readings from Last 3 Encounters:   03/11/25 20   09/24/24 18   10/17/23 20     PF Readings from Last 3 Encounters:   No data found for PF       Physical Exam  Vitals and nursing note reviewed.   Constitutional:       General: She is not in acute distress.     Appearance: Normal appearance. She is not ill-appearing.   HENT:      Head: Normocephalic.      Right Ear: Tympanic membrane, ear canal and external ear normal.      Left Ear: Tympanic membrane, ear canal and external ear normal.      Nose: Nose normal.      Mouth/Throat:      Mouth: Mucous membranes are moist.      Pharynx: Oropharynx is clear. Uvula midline. No posterior oropharyngeal erythema or uvula swelling.   Eyes:      Conjunctiva/sclera: Conjunctivae normal.      Pupils: Pupils are equal, round, and reactive to light.   Neck:      Thyroid: No thyroid mass or thyromegaly.      Vascular: Normal carotid pulses. No carotid bruit.   Cardiovascular:      Rate and Rhythm: Normal rate and regular rhythm.      Pulses: Normal pulses.      Heart sounds: Normal heart sounds.   Pulmonary:      Effort: Pulmonary effort is normal. No respiratory distress.      Breath sounds: Normal breath sounds. No wheezing, rhonchi or rales.   Abdominal:      Palpations: Abdomen is soft.      Tenderness: There is no abdominal tenderness.   Musculoskeletal:      Cervical back: Neck supple.      Right lower leg: No edema.      Left lower leg: No edema.   Lymphadenopathy:      Cervical: No cervical adenopathy.   Skin:     General: Skin is warm and dry.      Coloration: Skin is not jaundiced or pale.   Neurological:      General: No focal deficit present.      Mental Status: She is alert and oriented to person, place, and time.      Gait: Gait normal.   Psychiatric:         Mood and  Affect: Mood normal.         Behavior: Behavior normal.         Assessment and Plan     1. Routine general medical examination at a health care facility    2. Diabetes mellitus screening  -     Comprehensive Metabolic Panel; Future; Expected date: 03/11/2025    3. Screening for hyperlipidemia  -     Lipid Panel; Future; Expected date: 03/11/2025    4. Hypothyroidism (acquired)  Assessment & Plan:  Lab Results   Component Value Date    TSH 1.440 10/17/2023     Past due for thyroid monitoring.  Currently taking levothyroxine 100 mcg daily.  Recheck thyroid function and refill levothyroxine accordingly after lab review.    Orders:  -     TSH; Future; Expected date: 03/11/2025    5. Other long term (current) drug therapy  -     CBC Auto Differential; Future; Expected date: 03/11/2025  -     Comprehensive Metabolic Panel; Future; Expected date: 03/11/2025    6. Vitamin D deficiency  -     Vitamin D; Future; Expected date: 03/11/2025         Plan:   Assessment & Plan    IMPRESSION:  - Conducted [facility] Healthy You wellness visit and routine yearly follow-up for hypothyroidism  - Noted patient missed previous year's checkup, raising concern about medication adherence  - Will wait for thyroid lab results before refilling levothyroxine due to extended time since last level check  - Ordered comprehensive labs for overall health assessment and she requests vitamin-D be included    HYPOTHYROIDISM:  - Continued levothyroxine 100 mcg daily (pending lab results before refill).  - Ordered thyroid function test.  - Monitored the patient, a 60-year-old female, for routine yearly follow-up of hypothyroidism.  - Noted that the patient's thyroid level typically does not fluctuate.  - Evaluated the patient's current medication regimen of levothyroxine 100 mcg daily.  - Expressed concern about medication adherence as the patient may have been out of medication.  - Planned to check thyroid levels due to the extended period  since the last evaluation.  - Advised the patient to review thyroid levels before sending a refill for levothyroxine.    LABS:  - Ordered additional routine tests including lipids, complete blood count, kidney function, liver function, and vitamin D.    GENERAL HEALTH MAINTENANCE:  - Instructed the patient to maintain a well-balanced diet, ensure adequate hydration, engage in regular exercise, and adhere to prescribed medications.  - Patient to maintain a well-balanced diet  - Patient to ensure adequate water intake  - Recommend engaging in regular exercise  - Declines Shingrix, pneumococcal vaccine, or updating tetanus.    FOLLOW UP:  - Observed that the patient has not been seen since October 2023 and may have missed her appointment last year.  - Scheduled a follow-up visit for next year on 3/18/26 for [facility] Healthy You visit.  - Follow up on 3/18/26 as scheduled for next year's [facility] Healthy You visit  - Follow up on 3/26/25 for mammogram as scheduled             I have reviewed the medications, allergies, and problem list.     Goal Actions:    What type of visit is the patient here for today?: Healthy You  Does the patient consent to enroll in [facility] Healthy?:  (n/a)  Is this a Wellness Follow Up?: No  What is your overall wellness goal? (select at least one): Lifestyle modifications  Choose 3: Biometric, Lifestyle, Nutrition, Exercise  Biometric Actions: Attend regularly scheduled office visits  Lifestyle Actions : Take medications as prescribed  Nurtrition Actions: Eat a well-balanced diet, drink 8-10 glasses of water per day  Exercise Actions: Recommend physical activity 30 minutes per day 3-5 times/week      Follow up for as scheduled and as needed.    Signature:  JOSÉ MIGUEL LevinBC    Future Appointments   Date Time Provider Department Center   3/26/2025  8:20 AM [facility] MAMMO1 [facility] MAMMO [facility]   9/30/2025  1:30 PM Sasha Acevedo CNM LOKI LINO [facility]   3/18/2026  7:40 AM Frances Enriquez, ELEONORAP [facility] JOS Reyes   This note was generated with the assistance of ambient listening technology. Verbal consent was obtained by the patient and accompanying visitor(s) for the recording of patient appointment to facilitate this note. I attest to having reviewed and edited the generated note for accuracy, though some syntax or spelling errors may persist. Please contact the author of this note for any clarification.

## 2025-03-11 NOTE — ASSESSMENT & PLAN NOTE
Lab Results   Component Value Date    TSH 1.440 10/17/2023     Past due for thyroid monitoring.  Currently taking levothyroxine 100 mcg daily.  Recheck thyroid function and refill levothyroxine accordingly after lab review.

## 2025-03-12 ENCOUNTER — PATIENT OUTREACH (OUTPATIENT)
Facility: HOSPITAL | Age: 60
End: 2025-03-12
Payer: COMMERCIAL

## 2025-03-12 ENCOUNTER — RESULTS FOLLOW-UP (OUTPATIENT)
Dept: FAMILY MEDICINE | Facility: CLINIC | Age: 60
End: 2025-03-12

## 2025-03-12 DIAGNOSIS — E03.9 HYPOTHYROIDISM (ACQUIRED): Chronic | ICD-10-CM

## 2025-03-12 RX ORDER — LEVOTHYROXINE SODIUM 100 UG/1
100 TABLET ORAL DAILY
Qty: 90 TABLET | Refills: 3 | Status: SHIPPED | OUTPATIENT
Start: 2025-03-12

## 2025-03-12 NOTE — PROGRESS NOTES
Population Health Chart Review & Patient Outreach Details      Further Action Needed If Patient Returns Outreach:            Updates Requested / Reviewed:     []  Care Everywhere    []     []  External Sources ([facility], [facility], [facility], etc.)    [] [facility]   [] [facility]   [] Other:    []  Care Team Updated   []  Removed  or Duplicate Orders   []  Immunization Reconciliation Completed / Queried    [] [place]   [] [place]   [] [place]   [] [place]      Health Maintenance Topics Addressed and Outreach Outcomes / Actions Taken:             Breast Cancer Screening []  Mammogram Order Placed    []  Mammogram Screening Scheduled    []  External Records Requested & Care Team Updated if Applicable    []  External Records Uploaded & Care Team Updated if Applicable    []  Pt Declined Scheduling Mammogram    []  Pt Will Schedule with External Provider / Order Routed & Care Team Updated if Applicable              Cervical Cancer Screening []  Pap Smear Scheduled in Primary Care or OBGYN    []  External Records Requested & Care Team Updated if Applicable       []  External Records Uploaded, Care Team Updated, & History Updated if Applicable    []  Patient Declined Scheduling Pap Smear    []  Patient Will Schedule with External Provider & Care Team Updated if Applicable                  Colorectal Cancer Screening []  Colonoscopy Case Request / Referral / Home Test Order Placed    []  External Records Requested & Care Team Updated if Applicable    []  External Records Uploaded, Care Team Updated, & History Updated if Applicable    []  Patient Declined Completing Colon Cancer Screening    []  Patient Will Schedule with External Provider & Care Team Updated if Applicable    []  Fit Kit Mailed (add the SmartPhrase under additional notes)    []  Reminded Patient to Complete Home Test                Diabetic Eye Exam []  Eye Exam Screening Order Placed    []  Eye Camera Scheduled or Optometry/Ophthalmology Referral  Placed    []  External Records Requested & Care Team Updated if Applicable    []  External Records Uploaded, Care Team Updated, & History Updated if Applicable    []  Patient Declined Scheduling Eye Exam    []  Patient Will Schedule with External Provider & Care Team Updated if Applicable             Blood Pressure Control []  Primary Care Follow Up Visit Scheduled     []  Remote Blood Pressure Reading Captured    []  Patient Declined Remote Reading or Scheduling Appt - Escalated to PCP    []  Patient Will Call Back or Send Portal Message with Reading                 HbA1c & Other Labs []  Overdue Lab(s) Ordered    []  Overdue Lab(s) Scheduled    []  External Records Uploaded & Care Team Updated if Applicable    []  Primary Care Follow Up Visit Scheduled     []  Reminded Patient to Complete A1c Home Test    []  Patient Declined Scheduling Labs or Will Call Back to Schedule    []  Patient Will Schedule with External Provider / Order Routed, & Care Team Updated if Applicable           Primary Care Appointment []  Primary Care Appt Scheduled    []  Patient Declined Scheduling or Will Call Back to Schedule    []  Pt Established with External Provider, Updated Care Team, & Informed Pt to Notify Payor if Applicable           Medication Adherence /    Statin Use []  Primary Care Appointment Scheduled    []  Patient Reminded to  Prescription    []  Patient Declined, Provider Notified if Needed    []  Sent Provider Message to Review to Evaluate Pt for Statin, Add Exclusion Dx Codes, Document   Exclusion in Problem List, Change Statin Intensity Level to Moderate or High Intensity if Applicable                Osteoporosis Screening []  Dexa Order Placed    []  Dexa Appointment Scheduled    []  External Records Requested & Care Team Updated    []  External Records Uploaded, Care Team Updated, & History Updated if Applicable    []  Patient Declined Scheduling Dexa or Will Call Back to Schedule    []  Patient Will Schedule  with External Provider / Order Routed & Care Team Updated if Applicable       Additional Notes:.  Post visit Population Health review of encounter with date of service  3/11/25 with Zoe.  All required HY components in encounter.    Followup appt for: 3/18/26 HY

## 2025-03-26 ENCOUNTER — HOSPITAL ENCOUNTER (OUTPATIENT)
Dept: RADIOLOGY | Facility: HOSPITAL | Age: 60
Discharge: HOME | End: 2025-03-26
Attending: RADIOLOGY
Payer: COMMERCIAL

## 2025-03-26 DIAGNOSIS — R92.8 ABNORMAL MAMMOGRAM: ICD-10-CM

## 2025-03-26 DIAGNOSIS — Z12.31 VISIT FOR SCREENING MAMMOGRAM: ICD-10-CM

## 2025-03-26 PROCEDURE — 77063 BREAST TOMOSYNTHESIS BI: CPT | Mod: 26,,, | Performed by: RADIOLOGY

## 2025-03-26 PROCEDURE — 77063 BREAST TOMOSYNTHESIS BI: CPT | Mod: TC

## 2025-03-26 PROCEDURE — 77067 SCR MAMMO BI INCL CAD: CPT | Mod: 26,,, | Performed by: RADIOLOGY

## 2025-08-25 DIAGNOSIS — Z01.419 ROUTINE GYNECOLOGICAL EXAMINATION: ICD-10-CM

## 2025-08-25 DIAGNOSIS — N95.1 MENOPAUSAL SYMPTOM: ICD-10-CM

## 2025-08-25 RX ORDER — ESTRADIOL 0.1 MG/D
1 FILM, EXTENDED RELEASE TRANSDERMAL
Qty: 24 PATCH | Refills: 0 | Status: SHIPPED | OUTPATIENT
Start: 2025-08-25 | End: 2026-08-25